# Patient Record
Sex: FEMALE | Race: WHITE | NOT HISPANIC OR LATINO | Employment: FULL TIME | URBAN - METROPOLITAN AREA
[De-identification: names, ages, dates, MRNs, and addresses within clinical notes are randomized per-mention and may not be internally consistent; named-entity substitution may affect disease eponyms.]

---

## 2017-09-12 ENCOUNTER — TRANSCRIBE ORDERS (OUTPATIENT)
Dept: ADMINISTRATIVE | Facility: HOSPITAL | Age: 52
End: 2017-09-12

## 2017-09-12 DIAGNOSIS — Z12.31 ENCOUNTER FOR MAMMOGRAM TO ESTABLISH BASELINE MAMMOGRAM: Primary | ICD-10-CM

## 2017-09-14 ENCOUNTER — HOSPITAL ENCOUNTER (OUTPATIENT)
Dept: RADIOLOGY | Facility: HOSPITAL | Age: 52
Discharge: HOME | End: 2017-09-14
Payer: COMMERCIAL

## 2017-09-14 DIAGNOSIS — Z12.31 ENCOUNTER FOR MAMMOGRAM TO ESTABLISH BASELINE MAMMOGRAM: ICD-10-CM

## 2017-09-14 PROCEDURE — G0202 SCR MAMMO BI INCL CAD: HCPCS

## 2018-01-18 ENCOUNTER — TRANSCRIBE ORDERS (OUTPATIENT)
Dept: ADMINISTRATIVE | Facility: HOSPITAL | Age: 53
End: 2018-01-18

## 2018-01-18 DIAGNOSIS — R06.83 SNORING: ICD-10-CM

## 2018-01-18 DIAGNOSIS — R40.0 SLEEPINESS: Primary | ICD-10-CM

## 2018-01-18 DIAGNOSIS — R53.83 FATIGUE, UNSPECIFIED TYPE: ICD-10-CM

## 2018-04-11 ENCOUNTER — TRANSCRIBE ORDERS (OUTPATIENT)
Dept: ADMINISTRATIVE | Facility: HOSPITAL | Age: 53
End: 2018-04-11

## 2018-04-11 DIAGNOSIS — R10.9 ABDOMINAL PAIN, UNSPECIFIED ABDOMINAL LOCATION: Primary | ICD-10-CM

## 2018-04-11 DIAGNOSIS — K76.0 FATTY LIVER: ICD-10-CM

## 2018-04-16 ENCOUNTER — HOSPITAL ENCOUNTER (OUTPATIENT)
Dept: RADIOLOGY | Facility: HOSPITAL | Age: 53
Discharge: HOME | End: 2018-04-16
Attending: INTERNAL MEDICINE
Payer: COMMERCIAL

## 2018-04-16 DIAGNOSIS — R10.9 ABDOMINAL PAIN, UNSPECIFIED ABDOMINAL LOCATION: ICD-10-CM

## 2018-04-16 DIAGNOSIS — K76.0 FATTY LIVER: ICD-10-CM

## 2018-04-16 PROCEDURE — 76705 ECHO EXAM OF ABDOMEN: CPT

## 2018-04-17 ENCOUNTER — TRANSCRIBE ORDERS (OUTPATIENT)
Dept: ADMINISTRATIVE | Facility: HOSPITAL | Age: 53
End: 2018-04-17

## 2018-04-17 DIAGNOSIS — R16.0 LIVER MASS: Primary | ICD-10-CM

## 2018-04-25 ENCOUNTER — HOSPITAL ENCOUNTER (OUTPATIENT)
Dept: RADIOLOGY | Facility: HOSPITAL | Age: 53
Discharge: HOME | End: 2018-04-25
Attending: INTERNAL MEDICINE
Payer: COMMERCIAL

## 2018-04-25 DIAGNOSIS — R16.0 LIVER MASS: ICD-10-CM

## 2018-04-25 PROCEDURE — A9577 INJ MULTIHANCE: HCPCS | Performed by: INTERNAL MEDICINE

## 2018-04-25 PROCEDURE — 74183 MRI ABD W/O CNTR FLWD CNTR: CPT

## 2018-04-25 RX ADMIN — GADOBENATE DIMEGLUMINE 24 ML: 529 INJECTION, SOLUTION INTRAVENOUS at 17:23

## 2018-06-28 ENCOUNTER — TELEPHONE (OUTPATIENT)
Dept: NEUROLOGY | Facility: CLINIC | Age: 53
End: 2018-06-28

## 2018-07-17 ENCOUNTER — TELEPHONE (OUTPATIENT)
Dept: NEUROLOGY | Facility: CLINIC | Age: 53
End: 2018-07-17

## 2018-07-31 ENCOUNTER — TELEPHONE (OUTPATIENT)
Dept: NEUROLOGY | Facility: CLINIC | Age: 53
End: 2018-07-31

## 2018-08-20 ENCOUNTER — TRANSCRIBE ORDERS (OUTPATIENT)
Dept: ADMINISTRATIVE | Facility: HOSPITAL | Age: 53
End: 2018-08-20

## 2018-08-20 DIAGNOSIS — Z12.31 VISIT FOR SCREENING MAMMOGRAM: Primary | ICD-10-CM

## 2018-08-21 ENCOUNTER — OFFICE VISIT (OUTPATIENT)
Dept: SLEEP CENTER | Facility: CLINIC | Age: 53
End: 2018-08-21
Payer: COMMERCIAL

## 2018-08-21 VITALS
BODY MASS INDEX: 37.13 KG/M2 | SYSTOLIC BLOOD PRESSURE: 130 MMHG | HEIGHT: 68 IN | HEART RATE: 72 BPM | DIASTOLIC BLOOD PRESSURE: 90 MMHG | WEIGHT: 245 LBS

## 2018-08-21 DIAGNOSIS — R40.0 SLEEPINESS: ICD-10-CM

## 2018-08-21 DIAGNOSIS — R06.83 SNORING: Primary | ICD-10-CM

## 2018-08-21 DIAGNOSIS — F51.04 PSYCHOPHYSIOLOGICAL INSOMNIA: Primary | ICD-10-CM

## 2018-08-21 DIAGNOSIS — R53.83 FATIGUE, UNSPECIFIED TYPE: ICD-10-CM

## 2018-08-21 PROCEDURE — 99204 OFFICE O/P NEW MOD 45 MIN: CPT | Performed by: INTERNAL MEDICINE

## 2018-08-21 RX ORDER — TRAZODONE HYDROCHLORIDE 50 MG/1
TABLET ORAL
COMMUNITY
Start: 2018-02-25

## 2018-08-21 RX ORDER — METOPROLOL SUCCINATE 25 MG/1
25 TABLET, EXTENDED RELEASE ORAL DAILY
Refills: 2 | COMMUNITY
Start: 2018-08-02

## 2018-08-21 RX ORDER — TRAMADOL HYDROCHLORIDE 50 MG/1
50 TABLET ORAL EVERY 6 HOURS PRN
COMMUNITY
End: 2018-08-21 | Stop reason: CLARIF

## 2018-08-21 RX ORDER — LEVOTHYROXINE SODIUM 0.03 MG/1
25 TABLET ORAL DAILY
Refills: 5 | COMMUNITY
Start: 2018-08-06

## 2018-08-21 NOTE — PROGRESS NOTES
Consultation - [address] [facility] [address] : 1965  MRN: [id number]      Assessment:  The patient has chronic sleep initiation and sleep maintenance insomnia  There is a possible component of obstructive sleep apnea, since she does have a history of loud snoring, which has gotten worse over the last two years  Her sleep hygiene is unremarkable  I have given her a book to read on the topic  I suggested that she try puzzles such as Sudoku or crossword puzzles instead of reading  Plan:  Home sleep testing  I told her to discontinue trazodone  If it makes no difference in the sleep latency or sleep quality, I told her to discontinue it  Alternative treatment may be considered  I have referred the patient for cognitive behavioral therapy for insomnia  Her symptoms seem to be in part related to anxiety  We also discussed some specific measures about controlling her mental activity at bedtime  Follow up: After testing    History of Present Illness:   48 y  o female with a history of difficulty initiating and maintaining sleep since she was in college  She has never improved subsequently  She denies severe anxiety otherwise, but has a very active mind, with racing thoughts at bedtime  She has been reading books and lately listening to audio books to help her to fall asleep  She has loud snoring but no witnessed apnea  She does awakening with a gasping sensation  Her symptoms often occur at sleep onset  It takes her up to 45 minutes to fall asleep  She cannot nap during the day  She drinks only one cup of coffee every morning  She denies any shortness of breath, wheezing or gastroesophageal reflux  She denies any restless legs or other musculoskeletal complaints      Review of Systems:        Genitourinary need to urinate more than twice a night   Cardiology ankle/leg swelling   Gastrointestinal none   Neurology none   Constitutional none   Integumentary none   Psychiatry none Musculoskeletal joint pain and muscle aches   Pulmonary snoring   ENT none   Endocrine frequent urination   Hematological none           Historical Information    Past Medical History:  Hypothyroidism, goiter, hypertension      Family History: non-contributory      Sleep Schedule: The patient takes 45 minutes to fall asleep and awakens during the night, often unable to fall back to sleep  Snoring:    Yes      Witnessed Apnea:    No    Medications/Allergies:    Current Outpatient Prescriptions:     levothyroxine 25 mcg tablet, , Disp: , Rfl: 5    metoprolol succinate (TOPROL-XL) 25 mg 24 hr tablet, , Disp: , Rfl: 2    traZODone (DESYREL) 50 mg tablet, , Disp: , Rfl:         No notes on file                  Objective:    Vital Signs:   Vitals:    08/21/18 1300   BP: 130/90   Pulse: 72   Weight: 111 kg (245 lb)   Height: 5' 8" (1 727 m)     Neck Circumference: 39      [place] Sleepiness Scale: Total score: 0    Physical Exam:    General: Alert, appropriate, cooperative, overweight    Head: NC/AT, no retrognathia    Nose: No septal deviation, nares partially obstructed, mucosa normal    Throat: Airway lumen narrowed, tongue base thickened, no tonsils visualized    Neck: Normal, no thyromegaly or lymphadenopathy, no JVD    Heart: RR, normal S1 and S2, no murmurs    Chest: Clear bilaterally    Extremity: No clubbing, cyanosis, no edema    Skin: Warm, dry    Neuro: No motor abnormalities, cranial nerves appear intact      Counseling / Coordination of Care  Total clinic time spent today 35 minutes  Greater than 50% of total time was spent with the patient and / or family counseling and / or coordination of care  A description of the counseling / coordination of care: Sleep hygiene was discussed, including sleep schedule, avoidance of certain foods and beverages as well as maintenance of an environment in the bedroom, which is conducive to sleep   I have recommended the book No More Sleepless Nights, by Dr Barajas Peers Chelsie Reyes and KIARA Pandya    Board Certified Sleep Specialist

## 2018-09-17 ENCOUNTER — HOSPITAL ENCOUNTER (OUTPATIENT)
Dept: RADIOLOGY | Facility: HOSPITAL | Age: 53
Discharge: HOME | End: 2018-09-17
Payer: COMMERCIAL

## 2018-09-17 DIAGNOSIS — Z12.31 VISIT FOR SCREENING MAMMOGRAM: ICD-10-CM

## 2018-09-17 PROCEDURE — 77063 BREAST TOMOSYNTHESIS BI: CPT

## 2018-09-17 PROCEDURE — 77067 SCR MAMMO BI INCL CAD: CPT

## 2018-09-27 ENCOUNTER — TRANSCRIBE ORDERS (OUTPATIENT)
Dept: ADMINISTRATIVE | Facility: HOSPITAL | Age: 53
End: 2018-09-27

## 2018-09-27 DIAGNOSIS — K76.0 NAFL (NONALCOHOLIC FATTY LIVER): Primary | ICD-10-CM

## 2018-09-29 ENCOUNTER — HOSPITAL ENCOUNTER (OUTPATIENT)
Dept: RADIOLOGY | Facility: HOSPITAL | Age: 53
Discharge: HOME | End: 2018-09-29
Attending: INTERNAL MEDICINE
Payer: COMMERCIAL

## 2018-09-29 DIAGNOSIS — K76.0 NAFL (NONALCOHOLIC FATTY LIVER): ICD-10-CM

## 2018-09-29 PROCEDURE — 76700 US EXAM ABDOM COMPLETE: CPT

## 2018-10-10 ENCOUNTER — OFFICE VISIT (OUTPATIENT)
Dept: NEUROLOGY | Facility: CLINIC | Age: 53
End: 2018-10-10
Payer: COMMERCIAL

## 2018-10-10 VITALS
RESPIRATION RATE: 14 BRPM | BODY MASS INDEX: 37.13 KG/M2 | WEIGHT: 245 LBS | HEART RATE: 76 BPM | DIASTOLIC BLOOD PRESSURE: 80 MMHG | SYSTOLIC BLOOD PRESSURE: 120 MMHG | HEIGHT: 68 IN

## 2018-10-10 DIAGNOSIS — G57.51 TARSAL TUNNEL SYNDROME OF RIGHT SIDE: ICD-10-CM

## 2018-10-10 DIAGNOSIS — G57.52 TARSAL TUNNEL SYNDROME OF LEFT SIDE: ICD-10-CM

## 2018-10-10 DIAGNOSIS — G62.9 SENSORY NEUROPATHY: Primary | ICD-10-CM

## 2018-10-10 DIAGNOSIS — Z87.898 HISTORY OF BORDERLINE DIABETES MELLITUS: ICD-10-CM

## 2018-10-10 DIAGNOSIS — R20.0 RIGHT ARM NUMBNESS: ICD-10-CM

## 2018-10-10 PROCEDURE — 99244 OFF/OP CNSLTJ NEW/EST MOD 40: CPT | Performed by: PSYCHIATRY & NEUROLOGY

## 2018-10-10 NOTE — PROGRESS NOTES
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- repeat EMG and further work up as below  -     Vitamin B12; Future  -     Folate; Future  -     Vitamin B1, whole blood; Future  -     Vitamin B6; Future  -     Protein electrophoresis, serum; Future  -     NIMESH Screen w/ Reflex to Titer/Pattern; Future  -     RF Screen w/ Reflex to Titer; Future  -     HEMOGLOBIN A1C W/ EAG ESTIMATION; Future  -     EMG 1 Upper/1 Lower Neuropathy; Future    History of borderline diabetes mellitus  -     HEMOGLOBIN A1C W/ EAG ESTIMATION; Future    Tarsal tunnel syndrome of right side- severe axonal damage per 10/2017 report- discussed with her with tarsal tunnel surgery unlikely to regain further function with this given already severe damage- states she understands  -     EMG 1 Upper/1 Lower Neuropathy; Future  -     Ambulatory referral to Physical Therapy; Future    Tarsal tunnel syndrome of left side-- mild demyelinating per 10/2017 EMG  Will repeat and if further demyelination and/or acute denervation will refer back to her foot surgeon for tarsal tunnel surgery  -     EMG 1 Upper/1 Lower Neuropathy; Future  -     Ambulatory referral to Physical Therapy; Future for therapy for tarsal tunnel syndrome   - She tells me she has had "atypical- flat feet" and easy tendon ruptures with walking requiring multiple foot surgeries starting  In her 35s  She also reports joint laxity  No cardiac symptoms  No family hx  However with hx given concern for underlying connective tissue disorder- will consider rheumatology referral     Right arm numbness- radial nerve distribution hand radiating up to the forearm right  -     EMG 1 Upper/1 Lower Neuropathy; Future       She is not interested in nerve pain medication at this time    Further recs pending above  Follow up with me in 3-4 months time    Subjective:    HPI     Ms Pau Segura is a pleasant 47 yo female seen in consultation for significant foot pain  States several foot surgeries over time due to flat feet then easy achilles rupture and other joint issues- starting late 35s  States she was walking and ruptured right flexor hallucis tendon and another "calf tendon"  States she has weakness with plantarflexion - demonstrated this  States has pain in the inside of the ankles for the last two to three years ast least with all the foot surgeries  States the surgeries overall were successful but her pain in the inner side of her ankles continue to be severe  She had EMG done 10/2017 with 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  Left with mild demyelinating tibial neuropathy  States any activity is challenging as cannot stand over 15-20 minutes without a sharp shooting pain  Cannot go hiking or wear most shoes  Pain severity-- 5+ every day for years  No balance issues or falls  Does have right sharp shooting pain starting with weakness in right thumb going up the lateral aspect of the right hand  States increasing weakness over the month  States does not wake her in the middle of the night  States had an odd uncomfortable sensation at the base of he neck all the time all summer improved with massage etc      States had left hand pain and weakness with tingling sensation in the left arm years ago that resolved over time with minimal symptoms now  States did wear a brace- 5-7 years ago  No known spine issues per her  Had significant joint laxity when younger but no longer  Denies other health issues, states no cardiac issues personal or family  No DM hx, is on synthroid- does have pituitary adenoma on MRI- followed by endocrinology, no gastric absorption issues    Other history vertigo- improved with vestibular therapy  States mom had neuropathy in her 46s- from the low back per her     Denies family hx connective tissue disorders  States brother passed away from suspected thyroid storm,   Other hx pituitary adenoma  States tobacco use now- stopped five years ago  No alcohol use    The following portions of the patient's history were reviewed and updated as appropriate: allergies, current medications, past family history, past medical history, past social history, past surgical history and problem list          Objective:    Blood pressure 120/80, pulse 76, resp  rate 14, height 5' 8" (1 727 m), weight 111 kg (245 lb)  Physical Exam   Constitutional: She is oriented to person, place, and time  She appears well-developed and well-nourished  HENT:   Head: Normocephalic and atraumatic  Eyes: Pupils are equal, round, and reactive to light  Neck: Normal range of motion  Cardiovascular: Normal rate and regular rhythm  Pulmonary/Chest: Effort normal    Abdominal: Soft  Musculoskeletal: She exhibits no tenderness  Neurological: She is alert and oriented to person, place, and time  She has normal reflexes  Coordination normal    Nursing note and vitals reviewed  Neurological Exam  Mental Status  Alert  Oriented to person, place, time and situation  Recent and remote memory are intact  no dysarthria present  Language is fluent with no aphasia  Attention and concentration are normal     Cranial Nerves  CN II: Visual fields full to confrontation  CN III, IV, VI: Extraocular movements intact bilaterally  Pupils equal round and reactive to light bilaterally  CN V: Facial sensation is normal   CN VII: Full and symmetric facial movement  CN VIII: Hearing is normal   CN IX, X: Palate elevates symmetrically  Normal gag reflex  CN XI: Shoulder shrug strength is normal   CN XII: Tongue midline without atrophy or fasciculations  Motor   Normal muscle tone  Strength is 5/5 in all four extremities except as noted  4+5 right foot plantar flexion with 3-/5 right toe flexion   Left foot strength 5/5     Sensory  Sensation is intact to light touch, pinprick, vibration and proprioception in all four extremities  Light touch is normal in upper and lower extremities  Temperature is normal in upper and lower extremities  Vibration is normal in upper and lower extremities  No right-sided hemispatial neglect  No left-sided hemispatial neglect  Reflexes  Deep tendon reflexes are 2+ and symmetric in all four extremities with downgoing toes bilaterally  Coordination  Finger-to-nose, rapid alternating movements and heel-to-shin normal bilaterally without dysmetria  Gait Romberg is absent  Walks with left foot brace (s/p surgery) wide based normal stride length no ataxia  ROS:    Review of Systems   Constitutional: Negative  Negative for appetite change and fever  HENT: Negative  Negative for hearing loss, tinnitus, trouble swallowing and voice change  Eyes: Negative  Negative for photophobia and pain  Respiratory: Negative  Negative for shortness of breath  Cardiovascular: Negative  Negative for palpitations  Gastrointestinal: Negative  Negative for nausea and vomiting  Endocrine: Negative  Negative for cold intolerance and heat intolerance  Genitourinary: Positive for frequency and urgency  Negative for dysuria  Musculoskeletal: Negative  Negative for myalgias and neck pain  Joint pain  Pain while walking   Skin: Negative  Negative for rash  Neurological: Negative  Negative for dizziness, tremors, seizures, syncope, facial asymmetry, speech difficulty, weakness, light-headedness, numbness and headaches  Trouble falling asleep   Hematological: Negative  Does not bruise/bleed easily  Psychiatric/Behavioral: Negative  Negative for confusion, hallucinations and sleep disturbance

## 2018-10-10 NOTE — LETTER
October 10, 2018     [place], [address] [facility] [address] [address]    Patient: Danny Aponte   YOB: 1965   Date of Visit: 10/10/2018       Dear Dr Kaci Page: Thank you for referring Rejidebbie Newton to me for evaluation  Below are my notes for this consultation  If you have questions, please do not hesitate to call me  I look forward to following your patient along with you  Sincerely,        Gabbie Farah DO        CC: No Recipients  [address] [facility],   10/10/2018  5:01 PM  Sign at close encounter  Patient ID: Danny Aponte is a 48 y o  female  Assessment/Plan:    No problem-specific Assessment & Plan notes found for this encounter  Diagnoses and all orders for this visit:    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- repeat EMG and further work up as below  -     Vitamin B12; Future  -     Folate; Future  -     Vitamin B1, whole blood; Future  -     Vitamin B6; Future  -     Protein electrophoresis, serum; Future  -     NIMESH Screen w/ Reflex to Titer/Pattern; Future  -     RF Screen w/ Reflex to Titer; Future  -     HEMOGLOBIN A1C W/ EAG ESTIMATION; Future  -     EMG 1 Upper/1 Lower Neuropathy; Future    History of borderline diabetes mellitus  -     HEMOGLOBIN A1C W/ EAG ESTIMATION; Future    Tarsal tunnel syndrome of right side- severe axonal damage per 10/2017 report- discussed with her with tarsal tunnel surgery unlikely to regain further function with this given already severe damage- states she understands  -     EMG 1 Upper/1 Lower Neuropathy; Future  -     Ambulatory referral to Physical Therapy; Future    Tarsal tunnel syndrome of left side-- mild demyelinating per 10/2017 EMG  Will repeat and if further demyelination and/or acute denervation will refer back to her foot surgeon for tarsal tunnel surgery  -     EMG 1 Upper/1 Lower Neuropathy; Future  -     Ambulatory referral to Physical Therapy;  Future for therapy for tarsal tunnel syndrome   - She tells me she has had "atypical- flat feet" and easy tendon ruptures with walking requiring multiple foot surgeries starting  In her 35s  She also reports joint laxity  No cardiac symptoms  No family hx  However with hx given concern for underlying connective tissue disorder- will consider rheumatology referral     Right arm numbness- radial nerve distribution hand radiating up to the forearm right  -     EMG 1 Upper/1 Lower Neuropathy; Future       She is not interested in nerve pain medication at this time  Further recs pending above  Follow up with me in 3-4 months time    Subjective:    HPI     Ms Neha Olvera is a pleasant 49 yo female seen in consultation for significant foot pain  States several foot surgeries over time due to flat feet then easy achilles rupture and other joint issues- starting late 35s  States she was walking and ruptured right flexor hallucis tendon and another "calf tendon"  States she has weakness with plantarflexion - demonstrated this  States has pain in the inside of the ankles for the last two to three years ast least with all the foot surgeries  States the surgeries overall were successful but her pain in the inner side of her ankles continue to be severe  She had EMG done 10/2017 with 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  Left with mild demyelinating tibial neuropathy  States any activity is challenging as cannot stand over 15-20 minutes without a sharp shooting pain  Cannot go hiking or wear most shoes  Pain severity-- 5+ every day for years  No balance issues or falls  Does have right sharp shooting pain starting with weakness in right thumb going up the lateral aspect of the right hand  States increasing weakness over the month  States does not wake her in the middle of the night   States had an odd uncomfortable sensation at the base of he neck all the time all summer improved with massage etc      States had left hand pain and weakness with tingling sensation in the left arm years ago that resolved over time with minimal symptoms now  States did wear a brace- 5-7 years ago  No known spine issues per her  Had significant joint laxity when younger but no longer  Denies other health issues, states no cardiac issues personal or family  No DM hx, is on synthroid- does have pituitary adenoma on MRI- followed by endocrinology, no gastric absorption issues    Other history vertigo- improved with vestibular therapy  States mom had neuropathy in her 46s- from the low back per her  Denies family hx connective tissue disorders  States brother passed away from suspected thyroid storm,   Other hx pituitary adenoma  States tobacco use now- stopped five years ago  No alcohol use    The following portions of the patient's history were reviewed and updated as appropriate: allergies, current medications, past family history, past medical history, past social history, past surgical history and problem list          Objective:    Blood pressure 120/80, pulse 76, resp  rate 14, height 5' 8" (1 727 m), weight 111 kg (245 lb)  Physical Exam   Constitutional: She is oriented to person, place, and time  She appears well-developed and well-nourished  HENT:   Head: Normocephalic and atraumatic  Eyes: Pupils are equal, round, and reactive to light  Neck: Normal range of motion  Cardiovascular: Normal rate and regular rhythm  Pulmonary/Chest: Effort normal    Abdominal: Soft  Musculoskeletal: She exhibits no tenderness  Neurological: She is alert and oriented to person, place, and time  She has normal reflexes  Coordination normal    Nursing note and vitals reviewed  Neurological Exam  Mental Status  Alert  Oriented to person, place, time and situation  Recent and remote memory are intact  no dysarthria present  Language is fluent with no aphasia   Attention and concentration are normal     Cranial Nerves  CN II: Visual fields full to confrontation  CN III, IV, VI: Extraocular movements intact bilaterally  Pupils equal round and reactive to light bilaterally  CN V: Facial sensation is normal   CN VII: Full and symmetric facial movement  CN VIII: Hearing is normal   CN IX, X: Palate elevates symmetrically  Normal gag reflex  CN XI: Shoulder shrug strength is normal   CN XII: Tongue midline without atrophy or fasciculations  Motor   Normal muscle tone  Strength is 5/5 in all four extremities except as noted  4+5 right foot plantar flexion with 3-/5 right toe flexion  Left foot strength 5/5  Sensory  Sensation is intact to light touch, pinprick, vibration and proprioception in all four extremities  Light touch is normal in upper and lower extremities  Temperature is normal in upper and lower extremities  Vibration is normal in upper and lower extremities  No right-sided hemispatial neglect  No left-sided hemispatial neglect  Reflexes  Deep tendon reflexes are 2+ and symmetric in all four extremities with downgoing toes bilaterally  Coordination  Finger-to-nose, rapid alternating movements and heel-to-shin normal bilaterally without dysmetria  Gait Romberg is absent  Walks with left foot brace (s/p surgery) wide based normal stride length no ataxia  ROS:    Review of Systems   Constitutional: Negative  Negative for appetite change and fever  HENT: Negative  Negative for hearing loss, tinnitus, trouble swallowing and voice change  Eyes: Negative  Negative for photophobia and pain  Respiratory: Negative  Negative for shortness of breath  Cardiovascular: Negative  Negative for palpitations  Gastrointestinal: Negative  Negative for nausea and vomiting  Endocrine: Negative  Negative for cold intolerance and heat intolerance  Genitourinary: Positive for frequency and urgency  Negative for dysuria  Musculoskeletal: Negative  Negative for myalgias and neck pain          Joint pain  Pain while walking Skin: Negative  Negative for rash  Neurological: Negative  Negative for dizziness, tremors, seizures, syncope, facial asymmetry, speech difficulty, weakness, light-headedness, numbness and headaches  Trouble falling asleep   Hematological: Negative  Does not bruise/bleed easily  Psychiatric/Behavioral: Negative  Negative for confusion, hallucinations and sleep disturbance

## 2018-10-25 ENCOUNTER — EVALUATION (OUTPATIENT)
Dept: PHYSICAL THERAPY | Facility: CLINIC | Age: 53
End: 2018-10-25
Payer: COMMERCIAL

## 2018-10-25 DIAGNOSIS — G57.51 TARSAL TUNNEL SYNDROME OF RIGHT SIDE: ICD-10-CM

## 2018-10-25 DIAGNOSIS — G57.52 TARSAL TUNNEL SYNDROME OF LEFT SIDE: ICD-10-CM

## 2018-10-25 PROCEDURE — 97161 PT EVAL LOW COMPLEX 20 MIN: CPT

## 2018-10-25 PROCEDURE — G8979 MOBILITY GOAL STATUS: HCPCS

## 2018-10-25 PROCEDURE — G8978 MOBILITY CURRENT STATUS: HCPCS

## 2018-10-25 NOTE — PROGRESS NOTES
PT Evaluation     Today's date: 10/25/2018  Patient name: Gabriella Trujillo  : 1965  MRN: [id number]  Referring provider: Belén Hdez DO  Dx:   Encounter Diagnosis     ICD-10-CM    1  Tarsal tunnel syndrome of right side G57 51 Ambulatory referral to Physical Therapy   2  Tarsal tunnel syndrome of left side G57 52 Ambulatory referral to Physical Therapy       Start Time: 1535  Stop Time: 1605  Total time in clinic (min): 30 minutes    Assessment  Impairments: abnormal gait, abnormal muscle firing, abnormal muscle tone, abnormal or restricted ROM, abnormal movement, impaired balance, impaired physical strength, lacks appropriate home exercise program, pain with function and weight-bearing intolerance    Symptom irritability: high  Assessment details: Patient presents today with BL foot pain that is located inferior to medial malleolus and radiates up the calf  Pain is at best 2/10 and at worst 6/10 which is dull and achy but can become sharp  Moderate TTP at the tarsal tunnel present BL  Moderate-severe hypertonicity and length restrictions present at the gastrocs/soleus BL  Mod length restrictions also present at the HS, quads, and IT band indicated by special testing  Strength impairments present BL especially at musculature surrounding the hips and ankles  Ankle AROM/PROM limited with greatest impairments with R Dorsiflexion  Gait abnormalities include BL forefoot pronation, tibial IR and knee hyperextension present BL  Patient would benefit from skilled PT in order to reduce pain to 0/10 at best, improve strength of BL LEs to 5/5, reduce hypertonicity and length restrictions present in BL LEs, address gait abnormalities, and improve pain free AROM/PROM to [facility] in order to stand for long durations, ambulate, ascend/descend stairs, exercise, go on hikes, and 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     Understanding of Dx/Px/POC: good Prognosis: good    Goals  STGs  1  Reduce pain levels to 0/10 at best   2  Improve strength of BL LEs by 1/4-1/2 grade  3  Improve ankle ROM by 5 degrees so she can ambulate with functional motion available  4  Complete 3 sets of step ups pain free to progress to ambulating stairs  LTGs  1  Reduce pain levels to constant 0/10  2  Improve strength of BL LEs to 4+ or greater out of 5 so she can ambulate and stand for long durations while teaching as a   3  Improve ROM of BL ankles to WNL for all planes so she can walk without limitations due to motion availability  4  Ascend and descend 10 flights of stairs symptom free in order to safely ambulate stairs at home and in the community  Plan  Patient would benefit from: skilled physical therapy and PT eval  Planned modality interventions: manual electrical stimulation, TENS, thermotherapy: hydrocollator packs, ultrasound and unattended electrical stimulation  Planned therapy interventions: abdominal trunk stabilization, joint mobilization, manual therapy, neuromuscular re-education, orthotic fitting/training, patient education, orthotic management and training, postural training, strengthening, stretching, therapeutic activities, therapeutic exercise, home exercise program, graded motor, graded exercise, gait training, flexibility, balance/weight bearing training and balance  Frequency: 2x week  Duration in weeks: 6  Plan of Care beginning date: 10/25/2018  Plan of Care expiration date: 12/6/2018  Treatment plan discussed with: patient        Subjective Evaluation    History of Present Illness  Date of onset: 12/25/2016  Mechanism of injury: Patient reports chronic foot problems and has had 4 foot surgeries  Including buyon and tendon transfers  Since her last surgery  ( December 2016)she  has not felt good, & found out she has mod-severe nerve damage in her feet   Told she might need tarsal tunnel surgery by orthopedic and also referred her to neurologist  Saw neurologist who told her the nerve damage is not reversible and doesn't recommend getting surgery  Is getting another EMG done in November on the other foot  Cant wear shoes, cant stand longer than 15 min  She goes to the gym but feels like it isnt helping  Can not walk stairs like a normal person  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  Has Hx of tendon rupture in the R and had a tendon transfer in the L to prevent L side from rupturing  N/T present in feet as well as shooting pain into foot and shoots up to buttocks     Recurrent probem    Quality of life: good    Pain  Current pain rating: 3  At best pain ratin  At worst pain ratin  Location: Medial ankle into calf BL  Quality: dull ache and sharp  Relieving factors: ice, medications and rest  Aggravating factors: standing, walking, stair climbing and running    Social Support  Steps to enter house: yes  Stairs in house: yes   Lives in: multiple-level home  Lives with: spouse    Employment status: working    Diagnostic Tests  EMG: abnormal  Treatments  Previous treatment: physical therapy and medication  Current treatment: physical therapy  Patient Goals  Patient goals for therapy: decreased pain, improved balance, increased motion, return to work, independence with ADLs/IADLs, increased strength and return to sport/leisure activities  Patient goal: be able to stand, walk, ambulate stairs, go on hikes, and work as a teacher symptom free        Objective     Active Range of Motion   Left Ankle/Foot   Dorsiflexion (ke): 0 degrees   Dorsiflexion (kf): 0 degrees   Plantar flexion: 50 degrees with pain  Inversion: 15 degrees with pain  Eversion: 15 degrees with pain    Right Ankle/Foot   Dorsiflexion (ke): -25 degrees with pain  Dorsiflexion (kf): -15 degrees with pain  Plantar flexion: 60 degrees with pain  Inversion: 30 degrees with pain  Eversion: 15 degrees with pain    Passive Range of Motion   Left Ankle/Foot    Dorsiflexion (ke): 5 degrees   Dorsiflexion (kf): 5 degrees   Plantar flexion: 50 degrees with pain  Inversion: 30 degrees with pain  Eversion: 15 degrees with pain    Right Ankle/Foot    Dorsiflexion (ke): -20 degrees with pain  Dorsiflexion (kf): -10 degrees with pain   Plantar flexion: 60 degrees with pain  Inversion: 30 degrees with pain  Eversion: 20 degrees with pain    Strength/Myotome Testing     Left Hip   Normal muscle strength  Planes of Motion   Flexion: 4  Extension: 3-  Abduction: 4  External rotation: 4  Internal rotation: 4+    Right Hip   Planes of Motion   Flexion: 4-  Extension: 3-  External rotation: 4-  Internal rotation: 4-    Left Knee   Flexion: 4+  Extension: 5    Right Knee   Flexion: 4+  Extension: 5    Left Ankle/Foot   Dorsiflexion: 4  Plantar flexion: 4  Inversion: 4  Eversion: 4    Right Ankle/Foot   Dorsiflexion: 4-  Plantar flexion: 4-  Inversion: 3+  Eversion: 3+    Swelling   Left Ankle/Foot   Figure 8: 52 8 cm    Right Ankle/Foot   Figure 8: 52 8 cm    Ambulation     Comments   Gait:  Forefoot pronation BL  Tibial IR BL  Knee Hyperextensoin BL    General Comments     Ankle/Foot Comments   HS Flexibility:  (-30) BL  Tarsal Tunnel:  (+) BL     Bob Test:  (+) BL    Hemalatha Test:   (+) BL      Flowsheet Rows      Most Recent Value   PT/OT G-Codes   Current Score  56   Projected Score  81   FOTO information reviewed  Yes   Assessment Type  Evaluation   G code set  Mobility: Walking & Moving Around   Mobility: Walking and Moving Around Current Status ()  CK   Mobility: Walking and Moving Around Goal Status ()  CJ          Precautions: Standard      Daily Treatment Diary     Manual                                                                                   Exercise Diary Modalities

## 2018-10-30 ENCOUNTER — OFFICE VISIT (OUTPATIENT)
Dept: PHYSICAL THERAPY | Facility: CLINIC | Age: 53
End: 2018-10-30
Payer: COMMERCIAL

## 2018-10-30 DIAGNOSIS — G57.51 TARSAL TUNNEL SYNDROME OF RIGHT SIDE: Primary | ICD-10-CM

## 2018-10-30 DIAGNOSIS — G57.52 TARSAL TUNNEL SYNDROME OF LEFT SIDE: ICD-10-CM

## 2018-10-30 PROCEDURE — 97110 THERAPEUTIC EXERCISES: CPT

## 2018-10-30 NOTE — PROGRESS NOTES
Daily Note     Today's date: 10/30/2018  Patient name: Ami Sweet  : 1965  MRN: [id number]  Referring provider: Nixon Reich DO  Dx:   Encounter Diagnosis     ICD-10-CM    1  Tarsal tunnel syndrome of right side G57 51    2  Tarsal tunnel syndrome of left side G57 52        Start Time: 1715  Stop Time: 1800  Total time in clinic (min): 45 minutes    Subjective: Patient reports that she was sore after her eval ,and was limited in her exercises due to pain especially on the L side  Objective: See treatment diary below  Precautions: Standard  Daily Treatment Diary     Manual  10/30            STM Calf musculature and foot instrinsics 6 min                                                                     Exercise Diary  10/30            Tibial N  Glide             Gastrocs /Soleus Stretch 10 x 10" each gastrocs; soleus only on the R            Seated HR/TR 1 x 10each             Towel Scrunches 2 min each            Ankle 4 Way             Pro Stretch             Golf Ball Massage 1 min each            FTEO Firm             FTEC Foam                                                                                                                                                                Modalities                                                         Assessment: Tolerated treatment well  Unable to complete soleus stretch on the L side due to pain  Plan to progress manual tx to address her pain levels as well as adding pulsed ultrasound to promote tissue healing  Plan to add nerve glides to BL LEs due to complaints of N/T in LEs  Patient would benefit from continued PT in order to reduce pain, strengthen LEs and promote flexibility in LEs  Plan: Continue per plan of care

## 2018-11-06 ENCOUNTER — OFFICE VISIT (OUTPATIENT)
Dept: PHYSICAL THERAPY | Facility: CLINIC | Age: 53
End: 2018-11-06
Payer: COMMERCIAL

## 2018-11-06 DIAGNOSIS — G57.52 TARSAL TUNNEL SYNDROME OF LEFT SIDE: ICD-10-CM

## 2018-11-06 DIAGNOSIS — G57.51 TARSAL TUNNEL SYNDROME OF RIGHT SIDE: Primary | ICD-10-CM

## 2018-11-06 PROCEDURE — 97140 MANUAL THERAPY 1/> REGIONS: CPT

## 2018-11-06 PROCEDURE — 97110 THERAPEUTIC EXERCISES: CPT

## 2018-11-08 ENCOUNTER — OFFICE VISIT (OUTPATIENT)
Dept: PHYSICAL THERAPY | Facility: CLINIC | Age: 53
End: 2018-11-08
Payer: COMMERCIAL

## 2018-11-08 DIAGNOSIS — G57.52 TARSAL TUNNEL SYNDROME OF LEFT SIDE: ICD-10-CM

## 2018-11-08 DIAGNOSIS — G57.51 TARSAL TUNNEL SYNDROME OF RIGHT SIDE: Primary | ICD-10-CM

## 2018-11-08 PROCEDURE — 97110 THERAPEUTIC EXERCISES: CPT

## 2018-11-08 PROCEDURE — 97140 MANUAL THERAPY 1/> REGIONS: CPT

## 2018-11-08 NOTE — PROGRESS NOTES
Daily Note     Today's date: 2018  Patient name: Roni Nevarez  : 1965  MRN: [id number]  Referring provider: Jeaneth James DO  Dx:   Encounter Diagnosis     ICD-10-CM    1  Tarsal tunnel syndrome of right side G57 51    2  Tarsal tunnel syndrome of left side G57 52        Start Time: 0900  Stop Time: 0950  Total time in clinic (min): 50 minutes    Subjective: Patient reporths that her feet were throbbing after last session, but since then have improved  Today upon arrival her pain levels in BL feet are 1-2/10  Objective: See treatment diary below    Precautions: Standard  Daily Treatment Diary     Manual  10/30 11/6 11/8/18          STM Calf musculature and foot instrinsics 6 min  Calf musculature and foot instrinsics Calf musculature and foot instrinsics          A-P; M-L Ankle Joint Mobs  Grade II-III Grade II-III          Gastrocs Manual stretching  2 min each 2 min each                                        Exercise Diary  10/30 11/6/18 11/8/18          Tibial N  Glide             Gastrocs /Soleus Stretch 10 x 10" each gastrocs; soleus only on the R            Seated HR/TR 1 x 10each             Towel Scrunches 2 min each 2 min each 2 min each          Ankle 4 Way  YB 1 x 10 each direction BL           Pro Stretch  3 x 30" BL  6x 10" BL          Golf Ball Massage 1 min each 2 min each 2 min each          FTEO Firm             FTEC Foam             NuStep  10 min L1 10 min L1                       Seated N  [place]  20 x's BL           Wobble Board   20x forward and back/ 20x ML           BAPS   20x CW; 20 x CW          Toe Taps   15 x each                                                                               Modalities                                                           Assessment: Tolerated treatment well  Added BAPs board and wobble board to promote ankle mobility BL which increased pain by 1-2 pain levels  Manual tx reduced pain levels present after exercises   Patient would benefit from continued PT In order to continue to strengthen BL LEs and promote ankle mobility  Plan: Continue per plan of care

## 2018-11-13 ENCOUNTER — APPOINTMENT (OUTPATIENT)
Dept: PHYSICAL THERAPY | Facility: CLINIC | Age: 53
End: 2018-11-13
Payer: COMMERCIAL

## 2018-11-15 ENCOUNTER — APPOINTMENT (OUTPATIENT)
Dept: PHYSICAL THERAPY | Facility: CLINIC | Age: 53
End: 2018-11-15
Payer: COMMERCIAL

## 2018-11-20 ENCOUNTER — TELEPHONE (OUTPATIENT)
Dept: PHYSICAL THERAPY | Facility: CLINIC | Age: 53
End: 2018-11-20

## 2018-11-23 ENCOUNTER — HOSPITAL ENCOUNTER (OUTPATIENT)
Dept: SLEEP CENTER | Facility: CLINIC | Age: 53
Discharge: HOME | End: 2018-11-23
Payer: COMMERCIAL

## 2018-11-23 ENCOUNTER — OFFICE VISIT (OUTPATIENT)
Dept: PHYSICAL THERAPY | Facility: CLINIC | Age: 53
End: 2018-11-23
Payer: COMMERCIAL

## 2018-11-23 DIAGNOSIS — G57.53 TARSAL TUNNEL SYNDROME OF BOTH LOWER EXTREMITIES: ICD-10-CM

## 2018-11-23 DIAGNOSIS — R06.83 SNORING: ICD-10-CM

## 2018-11-23 DIAGNOSIS — R53.83 FATIGUE, UNSPECIFIED TYPE: ICD-10-CM

## 2018-11-23 DIAGNOSIS — R40.0 SLEEPINESS: ICD-10-CM

## 2018-11-23 PROCEDURE — G0399 HOME SLEEP TEST/TYPE 3 PORTA: HCPCS

## 2018-11-23 PROCEDURE — 97110 THERAPEUTIC EXERCISES: CPT

## 2018-11-23 PROCEDURE — 97140 MANUAL THERAPY 1/> REGIONS: CPT

## 2018-11-23 NOTE — PROGRESS NOTES
Daily Note     Today's date: 2018  Patient name: Mao Robles  : 1965  MRN: [id number]  Referring provider: Amol Gentile DO  Dx: No diagnosis found  Subjective: Patient reports that her feet have been very painful  As she has had 2 funerals to go to and had to wear uncomfortable shoes  She was also on her feet a lot preparing for thanksgiving  Today pain is 7/10 in BL feet  Objective: See treatment diary below    Precautions: Standard  Daily Treatment Diary     Manual  10/30 11/6 11/8/18 11/23         STM Calf musculature and foot instrinsics 6 min  Calf musculature and foot instrinsics Calf musculature and foot instrinsics Calf musculature and foot instrinsics         A-P; M-L Ankle Joint Mobs  Grade II-III Grade II-III Grade II-III         Gastrocs Manual stretching  2 min each 2 min each 2 min each                                       Exercise Diary  10/30 11/6/18 11/8/18 11/23/18         Tibial N  Glide             Gastrocs /Soleus Stretch 10 x 10" each gastrocs; soleus only on the R            Seated HR/TR 1 x 10each             Towel Scrunches 2 min each 2 min each 2 min each 2 min each         Ankle 4 Way  YB 1 x 10 each direction BL  YB 1 x 10 each direction BL         Pro Stretch  3 x 30" BL  6x 10" BL          Golf Ball Massage 1 min each 2 min each 2 min each 2 min each         FTEO Firm             FTEC Foam             NuStep  10 min L1 10 min L1 6' L 1                      Seated N  [place]  20 x's BL  15x BL         Wobble Board   20x forward and back/ 20x ML           BAPS   20x CW; 20 x CW          Toe Taps   15 x each 15 x each         New Site     2 min          Plantar Fascia Stretch    6 x 10" each                                                    Modalities                                                         Assessment: Tolerated treatment well   Reduction of pain levels  After stretching and mobility exercises for the plantar fascia, foot intrinsics and the gastrocs  Mod-severe hypertonicity present of the foot intrinsics, gastrocs, soleus, and peroneals which reduced after manual tx  Plan: Continue per plan of care

## 2018-11-26 ENCOUNTER — HOSPITAL ENCOUNTER (OUTPATIENT)
Dept: NEUROLOGY | Facility: CLINIC | Age: 53
Discharge: HOME | End: 2018-11-26
Payer: COMMERCIAL

## 2018-11-26 DIAGNOSIS — G57.52 TARSAL TUNNEL SYNDROME OF LEFT SIDE: ICD-10-CM

## 2018-11-26 DIAGNOSIS — R20.0 RIGHT ARM NUMBNESS: ICD-10-CM

## 2018-11-26 DIAGNOSIS — G57.51 TARSAL TUNNEL SYNDROME OF RIGHT SIDE: ICD-10-CM

## 2018-11-26 DIAGNOSIS — G62.9 SENSORY NEUROPATHY: ICD-10-CM

## 2018-11-26 PROCEDURE — 95886 MUSC TEST DONE W/N TEST COMP: CPT | Performed by: PHYSICAL MEDICINE & REHABILITATION

## 2018-11-26 PROCEDURE — 95913 NRV CNDJ TEST 13/> STUDIES: CPT | Performed by: PHYSICAL MEDICINE & REHABILITATION

## 2018-11-26 NOTE — PROCEDURES
[facility]   [facility] [address] , [address]   [phone]        Name: Honey Lake  Patient ID: [id number]   Age: 45 Years 8 Months  YOB: 1974   Account Number:    Gender: Female   Technologist:    Date of Exam: 11/26/2018 09:09   Referring Physician: Felecia Sensor, DO  Temperature: 31 5 right foot, 32 9 right hand   Examining Physician: Elena Manzo MD  Height:                 Patient History:   45 y o female with history of 5 foot/ankle surgeries for chronic foot/ankle deformities  Had EMG in Oct 2017 with bilateral tarsal tunnel syndrome  Referred for peripheral neuropathy/tarsal tunnel evaluation  Also with right upper extremity numbness/tingling along dorsolateral wrist/forearm radiating to forearm, for approximately 5-6 months  Exam: Strength 5/5 bilateral hip flexion, knee extension, dorsi/plantarflexion, EHL  Strength 5/5 R finger flexion/abduction, wrist extension, elbow extension/flexion, shoulder abduction  [address]      Nerve / Sites Muscle Latency Ref  Amplitude Ref  Duration Rel Amp Distance Lat Diff Velocity Ref  ms ms mV mV ms % mm ms m/s m/s   R Median - APB      Wrist APB 3 02 ?4 20 11 2 ?4 0 6 61 100 70         Elbow APB 6 88  9 9  5 26 88 9 200 3 85 52 ?50   R Ulnar - ADM      Wrist ADM 2 50 ?3 30 10 7 ?5 0 4 95 100 70         B  Elbow ADM 5 68  8 2  7 40 76 6 190 3 18 60 ?50      A  Elbow ADM 7 29  8 5  7 29 104 120 1 61 74 ?49            4 79     R Peroneal - EDB      Ankle EDB 4 11 ?5 50 6 1 ?2 0 5 31 100 80         Fib head EDB 10 05  4 5  5 57 73 3 290 5 94 49 ?40      Pop fossa EDB 12 14  5 8  5 94 130 100 2 08 48 ?40            8 02     L Peroneal - EDB      Ankle EDB 4 27 ?5 50 6 4 ?2 0 6 41 100 80         Fib head EDB 10 10  6 0  6 61 93 2 285 5 83 49 ?40      Pop fossa EDB 11 98  5 8  6 35 97 3 100 1 88 53 ?40            7 71     R Tibial - AH      Ankle AH 4 27 ?6 00 1 3 ?4 0 6 51 100 100         Pop fossa  11 72  1 1 6 15 89 2 330 7 45 44 ?40   L Tibial - AH      Ankle AH 5 10 ?6 00 4 4 ?4 0 5 57 100 100         Pop fossa AH 12 19  4 8  6 51 109 320 7 08 45 ?40       SNC      Nerve / Sites Rec  Site Onset Lat Peak Lat Ref  Amp Ref  Distance Peak Diff Ref  Velocity Ref  ms ms ms µV µV mm ms ms m/s m/s   R Median - Digit II (Antidromic)      Wrist Dig II 2 40 3 07 ?3 50 27 0 ?10 0 130   54 ?50   R Ulnar - Digit V (Antidromic)      Wrist Dig V 2 08 2 76 ?3 10 23 0 ?10 0 110   53 ?50   R Radial - Anatomical snuff box (Forearm)      Forearm Wrist 1 56 2 14 ?2 90 19 5 ?10 0 100   64 ?50   R Sural - Ankle (Calf)      Calf Ankle 3 02 3 70 ?4 00 3 1 ?6 0 140   46 ?40   L Sural - Ankle (Calf)      Calf Ankle 3 28 3 80 ?4 00 3 3 ?6 0 140   43 ?40   R Superficial peroneal - Ankle      Lat leg Ankle NR NR ?3 50 NR ?6 0 100   NR ?40   L Superficial peroneal - Ankle      Lat leg Ankle NR NR ?3 50 NR ?6 0 100   NR ?40   R Median, Ulnar - Transcarpal comparison      Median Palm Wrist 1 30 1 77 ?2 20 92 1 ?50 0 80   61       Ulnar Palm Wrist 1 30 1 77 ?2 20 29 1 ? 12 0 80   61            0 00 ?0 40     R Medial plantar, Lateral plantar - Ankle (Medial, lateral sole)      Medial plantar Sole Ankle NR NR ?3 70 NR ?2 0 140   NR ?45      Lateral plantar Sole Ankle NR NR ?3 70 NR ?2 0 140   NR ?45           NR      L Medial plantar, Lateral plantar - Ankle (Medial, lateral sole)      Medial plantar Sole Ankle 3 91 4 95 ?3 70 1 8 ?2 0 140   36 ?45      Lateral plantar Sole Ankle 2 92 3 49 ?3 70 3 0 ?2 0 140   48 ?45           1 46          EMG              Needle EMG Examination     Insertional Spontaneous MUAP   Muscle Nerve Roots Activity Fib PSW Fasc Dur  Amp Poly Config Recruitment   R  Deltoid Axillary C5-C6 Normal None None None Normal Normal None Normal Normal   R  Biceps brachii Musculocutaneous C5-C6 Normal None None None Normal Normal None Normal Normal   R   Triceps brachii Radial C6-C8 Normal None None None Normal Normal None Normal Normal   R  Pronator teres Median C6-C7 Normal None None None Normal Normal None Normal Normal   R  First dorsal interosseous Ulnar C8-T1 Normal None None None Normal Normal None Normal Normal   R  Extensor indicis proprius Radial C7-C8 Normal None None None Normal Normal None Normal Normal   L  Vastus medialis Femoral L2-L4 Normal None None None Normal Normal None Normal Normal   L  Tibialis anterior Peroneal L4-L5 Normal None None None Normal Normal None Normal Normal   L  Gastrocnemius (Medial head) Tibial S1-S2 Normal None None None Normal Normal None Normal Normal   L  Peroneus longus Perineal L5-S1 Normal None None None Normal Normal None Normal Normal   L  Gluteus medius Superior gluteal L4-S1 Normal None None None Normal Normal None Normal Normal   L  Flexor hallucis brevis Tibial S1-S2 Incr  1+ None None Normal Normal None Normal Normal         Findings:   Motor:  Left peroneal compound motor action potential (CMAP) to the extensor digitorum brevis demonstrated normal distal latency, normal amplitude, and normal conduction velocity across the fibular head and lower leg  Left tibial compound motor action potential (CMAP) to the abductor hallucis demonstrated normal distal latency, normal amplitude, and normal conduction velocity across the lower leg  Right peroneal compound motor action potential (CMAP) to the extensor digitorum brevis demonstrated normal distal latency, normal amplitude, and normal conduction velocity across the fibular head and lower leg  Right tibial compound motor action potential (CMAP) to the abductor hallucis demonstrated normal distal latency, decreased amplitude, and normal conduction velocity across the lower leg       Right median compound motor action potential (CMAP) demonstrated normal distal latency, normal amplitude, and normal conduction velocity across the forearm    Right ulnar compound motor action potential (CMAP) demonstrated normal distal latency, normal amplitude, and normal conduction velocity across the elbow and forearm  Sensory:  Left sural sensory nerve action potential (SNAP) demonstrated normal peak latency and decreased amplitude  Left superficial peroneal sensory nerve action potential (SNAP) unobtainable  Left medial plantar SNAP demonstrated prolonged peak latency and decreased amplitude  Left lateral plantar SNAP demonstrated normal peak latency and normal amplitude  Right sural sensory nerve action potential (SNAP) demonstrated normal peak latency and decreased amplitude  Right superficial peroneal sensory nerve action potential (SNAP) unobtainable  Right medial and lateral plantar SNAPs unobtainable  Right median sensory nerve action potential (SNAP) demonstrated normal amplitude and normal peak latency  Right ulnar sensory nerve action potential (SNAP) demonstrated normal amplitude and normal peak latency  Right radial sensory nerve action potential (SNAP) demonstrated normal amplitude and normal peak latency  Right median and ulnar sensory nerve action potential (SNAP) comparison study to the wrist (transcarpal) demonstrated normal latency difference  EMG:  A disposable monopolar needle was used to study selected muscles in the right upper extremity including the deltoid, biceps, triceps, pronator teres, first dorsal interosseous, and extensor indicis proprius  All muscles tested demonstrated normal insertional activity, no abnormal spontaneous activity, and normal volitional motor unit action potentials  A disposable monopolar needle was used to study selected muscles in the left lower extremity including the tibialis anterior, medial gastrocnemius, peroneus longus, vastus medialis, and gluteus medius  The left flexor hallucis brevis demonstrated abnormal spontaneous activity with positive sharp waves   All other muscles tested demonstrated normal insertional activity, no abnormal spontaneous activity, and normal volitional motor unit action potentials  Impression: Abnormal study  1  There is electrodiagnostic evidence of a right tibial axonal sensorimotor mononeuropathy across the ankle, consistent with clinical diagnosis of severe tarsal tunnel syndrome  2  There is electrodiagnostic evidence of a left tibial demyelinating sensory mononeuropathy across the ankle, primarily affecting the medial plantar branch, consistent with clinical diagnosis of mild tarsal tunnel syndrome  There is no evidence of a left lower extremity entrapment neuropathy otherwise  3  There is electrodiagnostic evidence to suggest a superimposed length-dependent, axonal, large fiber sensory peripheral polyneuropathy  4  There is no electrodiagnostic evidence of a right upper extremity entrapment mononeuropathy, brachial plexopathy, or cervical radiculopathy  5  There is no electrodiagnostic evidence of a left lumbar radiculopathy or lumbosacral plexopathy               ___________________________  Ifeoma Martinez MD

## 2018-11-27 ENCOUNTER — OFFICE VISIT (OUTPATIENT)
Dept: PHYSICAL THERAPY | Facility: CLINIC | Age: 53
End: 2018-11-27
Payer: COMMERCIAL

## 2018-11-27 DIAGNOSIS — G57.51 TARSAL TUNNEL SYNDROME OF RIGHT SIDE: Primary | ICD-10-CM

## 2018-11-27 DIAGNOSIS — G57.52 TARSAL TUNNEL SYNDROME OF LEFT SIDE: ICD-10-CM

## 2018-11-27 DIAGNOSIS — G47.33 OSA (OBSTRUCTIVE SLEEP APNEA): Primary | ICD-10-CM

## 2018-11-27 DIAGNOSIS — G57.53 TARSAL TUNNEL SYNDROME OF BOTH LOWER EXTREMITIES: ICD-10-CM

## 2018-11-27 PROCEDURE — G8979 MOBILITY GOAL STATUS: HCPCS

## 2018-11-27 PROCEDURE — G8978 MOBILITY CURRENT STATUS: HCPCS

## 2018-11-27 PROCEDURE — 97110 THERAPEUTIC EXERCISES: CPT

## 2018-11-27 PROCEDURE — 97140 MANUAL THERAPY 1/> REGIONS: CPT

## 2018-11-27 NOTE — PROGRESS NOTES
PT Re-Evaluation     Today's date: 2018  Patient name: Veronica Solis  : 1965  MRN: [id number]  Referring provider: Carolyne Mart DO  Dx:   Encounter Diagnosis     ICD-10-CM    1  Tarsal tunnel syndrome of right side G57 51 PT plan of care cert/re-cert   2  Tarsal tunnel syndrome of both lower extremities G57 53 PT plan of care cert/re-cert   3  Tarsal tunnel syndrome of left side G57 52 PT plan of care cert/re-cert       Start Time: 1530  Stop Time: 1610  Total time in clinic (min): 40 minutes    Assessment  Assessment details: Patient presents today with BL foot pain that is located inferior to medial malleolus and radiates up the calf  Pain is at best 2/10 and at worst 6/10 which is dull and achy but can become sharp  Moderate TTP at the tarsal tunnel present BL  Moderate-severe hypertonicity and length restrictions present at the gastrocs/soleus BL  Mod length restrictions also present at the HS, quads, and IT band indicated by special testing  Strength impairments present BL especially at musculature surrounding the hips and ankles  Ankle AROM/PROM limited with greatest impairments with R Dorsiflexion  Gait abnormalities include BL forefoot pronation, tibial IR and knee hyperextension present BL  Patient would benefit from skilled PT in order to reduce pain to 0/10 at best, improve strength of BL LEs to 5/5, reduce hypertonicity and length restrictions present in BL LEs, address gait abnormalities, and improve pain free AROM/PROM to [facility] in order to stand for long durations, ambulate, ascend/descend stairs, exercise, go on hikes, and 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  18    Since beginning PT patient has made improvements in AROM and PROM of BL ankles, by 5-10 degrees or greater  Continues to have pain with ROM bilaterally, which contributes to ROM restrictions as well as muscular tightness   Reduction of swelling in BL LEs indicated by girth measurements  Improvements in LE strength bilaterally as well as ankle stabilizers, but still has weakness present especially at BL hips and BL ankles contributing to continuing gait abnormalities and pain  Reduction of hypertonicity and length restrictions at the gastrocs/soleus to moderate levels BL  Continues to have mod length restrictions also present at the HS, quads, and IT band indicated by special testing  Continues to have constant pain that is at worst 6/10 and avoids aggravating activities  Patient would benefit from skilled PT in order to reduce pain levels, improve ROM to WNL in all planes, improve strength to 4+ or 5 in BL LEs,  Address gait abnormalities and reduce hypertonicity and length restrictions in order to stand and ambulate for long periods of time, exercise, ambulate stairs, complete ADLs and household chores requiring standing and walking and 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  Impairments: abnormal gait, abnormal muscle firing, abnormal muscle tone, abnormal or restricted ROM, abnormal movement, impaired balance, impaired physical strength, lacks appropriate home exercise program, pain with function and weight-bearing intolerance    Symptom irritability: highUnderstanding of Dx/Px/POC: good   Prognosis: good    Goals  STGs  1  Reduce pain levels to 0/10 at best -NOT MET  2  Improve strength of BL LEs by 1/4-1/2 grade  -MET  3  Improve ankle ROM by 5 degrees so she can ambulate with functional motion available  -MET  4  Complete 3 sets of step ups pain free to progress to ambulating stairs  -NOT MET    LTGs  1  Reduce pain levels to constant 0/10-NOT MET  2  Improve strength of BL LEs to 4+ or greater out of 5 so she can ambulate and stand for long durations while teaching as a -PARTIALLY MET  3   Improve ROM of BL ankles to WNL for all planes so she can walk without limitations due to motion availability-PARTIALLY MET  4  Ascend and descend 10 flights of stairs symptom free in order to safely ambulate stairs at home and in the community  -NOT MET    Plan  Patient would benefit from: skilled physical therapy and PT eval  Planned modality interventions: manual electrical stimulation, TENS, thermotherapy: hydrocollator packs, ultrasound and unattended electrical stimulation  Planned therapy interventions: abdominal trunk stabilization, joint mobilization, manual therapy, neuromuscular re-education, orthotic fitting/training, patient education, orthotic management and training, postural training, strengthening, stretching, therapeutic activities, therapeutic exercise, home exercise program, graded motor, graded exercise, gait training, flexibility, balance/weight bearing training and balance  Frequency: 2x week  Duration in weeks: 10  Plan of Care beginning date: 10/25/2018  Plan of Care expiration date: 1/3/2019  Treatment plan discussed with: patient        Subjective Evaluation    History of Present Illness  Date of onset: 12/25/2016  Mechanism of injury: Patient reports chronic foot problems and has had 4 foot surgeries  Including buyon and tendon transfers  Since her last surgery  ( December 2016)she  has not felt good, & found out she has mod-severe nerve damage in her feet  Told she might need tarsal tunnel surgery by orthopedic and also referred her to neurologist  Saw neurologist who told her the nerve damage is not reversible and doesn't recommend getting surgery  Is getting another EMG done in November on the other foot  Cant wear shoes, cant stand longer than 15 min  She goes to the gym but feels like it isnt helping  Can not walk stairs like a normal person  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  Has Hx of tendon rupture in the R and had a tendon transfer in the L to prevent L side from rupturing  N/T present in feet as well as shooting pain into foot and shoots up to buttocks  18  Patient reported that she had her EMG yesterday and the neurologist stated that she had signs of peripheral neuropathy  Since beginning PT patient stated that she feels she has improved by 30%, but she continues to avoid aggravating activities such as standing or walking for long durations  States that her pain is still 6/10 at worst when she walks and stands on it for long durations     Recurrent probem    Quality of life: good    Pain  No pain reported  Current pain ratin  At best pain ratin  At worst pain ratin  Location: Medial ankle into calf BL  Quality: dull ache and sharp  Relieving factors: ice, medications and rest  Aggravating factors: standing, walking, stair climbing and running    Social Support  Steps to enter house: yes  Stairs in house: yes   Lives in: multiple-level home  Lives with: spouse    Employment status: working    Diagnostic Tests  EMG: abnormal  Treatments  Previous treatment: physical therapy and medication  Current treatment: physical therapy  Patient Goals  Patient goals for therapy: decreased pain, improved balance, increased motion, return to work, independence with ADLs/IADLs, increased strength and return to sport/leisure activities  Patient goal: be able to stand, walk, ambulate stairs, go on hikes, and work as a teacher symptom free        Objective     Active Range of Motion   Left Ankle/Foot   Dorsiflexion (ke): 5 degrees   Dorsiflexion (kf): 0 degrees   Plantar flexion: 55 degrees with pain  Inversion: 30 degrees with pain  Eversion: 20 degrees with pain    Right Ankle/Foot   Dorsiflexion (ke): 0 degrees with pain  Dorsiflexion (kf): 0 degrees with pain  Plantar flexion: 60 degrees   Inversion: 30 degrees with pain  Eversion: 30 degrees     Passive Range of Motion   Left Ankle/Foot    Dorsiflexion (ke): 10 degrees   Dorsiflexion (kf): 5 degrees with pain  Plantar flexion: 55 degrees with pain  Inversion: 30 degrees with pain  Eversion: 20 degrees with pain    Right Ankle/Foot  Normal passive range of motion  Dorsiflexion (ke): 5 degrees with pain  Dorsiflexion (kf): 0 degrees with pain   Plantar flexion: 60 degrees with pain  Inversion: 35 degrees with pain  Eversion: 30 degrees with pain    Strength/Myotome Testing     Left Hip   Normal muscle strength  Planes of Motion   Flexion: 4  Extension: 4-  Abduction: 4  External rotation: 4  Internal rotation: 4+    Right Hip   Planes of Motion   Flexion: 4  Extension: 4-  Abduction: 4  External rotation: 4  Internal rotation: 4    Left Knee   Flexion: 4+  Extension: 5    Right Knee   Flexion: 4+  Extension: 5    Left Ankle/Foot   Dorsiflexion: 4+  Plantar flexion: 4+  Inversion: 4+  Eversion: 4+    Right Ankle/Foot   Dorsiflexion: 4  Plantar flexion: 4  Inversion: 4  Eversion: 4    Swelling   Left Ankle/Foot   Figure 8: 51 6 cm    Right Ankle/Foot   Figure 8: 51 4 cm    Ambulation     Comments   Gait:  Forefoot pronation BL  Tibial IR BL  Knee Hyperextensoin BL    11/27/18  Forefoot pronation BL  Tibial IR BL  Knee Hyperextensoin BL    General Comments     Lumbar Comments  LE Sensation: Normal BL with light touch (plan to test with monofilaments next session)    Ankle/Foot Comments   HS Flexibility:  (-30) BL  Tarsal Tunnel:  (+) BL     Bob Test:  (+) BL    Hemalatha Test:   (+) BL      Flowsheet Rows      Most Recent Value   PT/OT G-Codes   Current Score  58   Projected Score  65   FOTO information reviewed  Yes   Assessment Type  Re-evaluation   G code set  Mobility: Walking & Moving Around   Mobility: Walking and Moving Around Current Status ()  CK   Mobility: Walking and Moving Around Goal Status ()  CJ          Precautions: Standard      Daily Treatment Diary     Manual  11/27/18            STM Calf musculature, foot intrinsics, BL            Gastrocs/soleus stretch 3 x 30" each                                                       Exercise Diary  11/27/18            HEP review 5'            Strength/ROM testing 25'                                                                                                                                                                                                                                                          Modalities  11/27            [facility] 10' pre tx

## 2018-11-28 ENCOUNTER — TELEPHONE (OUTPATIENT)
Dept: NEUROLOGY | Facility: CLINIC | Age: 53
End: 2018-11-28

## 2018-11-28 NOTE — TELEPHONE ENCOUNTER
----- Message from Malauzai SoftwareDO sent at 11/28/2018 10:20 AM EST -----  Regarding: emg results  Please let the patient know that in reviewing her current EMG results and comparing it with 10/2017, they appear similar- she has severe tarsal tunnel on the right and mild demyelinating on the left      If she has any questions, please let me know    Thanks  ----- Message -----  From: Chelsie Dennis MD  Sent: 11/26/2018  10:34 AM  To: Ana Paula SAFCell DO Arron

## 2018-11-28 NOTE — TELEPHONE ENCOUNTER
Called and Left a message on pt's answering machine for a call back  Both numbers listed were attempted

## 2018-11-29 ENCOUNTER — OFFICE VISIT (OUTPATIENT)
Dept: PHYSICAL THERAPY | Facility: CLINIC | Age: 53
End: 2018-11-29
Payer: COMMERCIAL

## 2018-11-29 ENCOUNTER — TELEPHONE (OUTPATIENT)
Dept: SLEEP CENTER | Facility: CLINIC | Age: 53
End: 2018-11-29

## 2018-11-29 ENCOUNTER — TELEPHONE (OUTPATIENT)
Dept: NEUROLOGY | Facility: CLINIC | Age: 53
End: 2018-11-29

## 2018-11-29 DIAGNOSIS — G57.52 TARSAL TUNNEL SYNDROME OF LEFT SIDE: ICD-10-CM

## 2018-11-29 DIAGNOSIS — G57.51 TARSAL TUNNEL SYNDROME OF RIGHT SIDE: Primary | ICD-10-CM

## 2018-11-29 DIAGNOSIS — G57.53 TARSAL TUNNEL SYNDROME OF BOTH LOWER EXTREMITIES: ICD-10-CM

## 2018-11-29 PROCEDURE — 97140 MANUAL THERAPY 1/> REGIONS: CPT

## 2018-11-29 PROCEDURE — 97110 THERAPEUTIC EXERCISES: CPT

## 2018-11-29 NOTE — TELEPHONE ENCOUNTER
Advised patient Home sleep study shows BILLY  Treatment with Apap ordered  DME set up scheduled for 12/27/2018 RX and paperwork to [facility]

## 2018-11-29 NOTE — PROGRESS NOTES
Daily Note     Today's date: 2018  Patient name: Andrew Barrett  : 1965  MRN: [id number]  Referring provider: Arnaldo Spann DO  Dx:   Encounter Diagnosis     ICD-10-CM    1  Tarsal tunnel syndrome of right side G57 51    2  Tarsal tunnel syndrome of both lower extremities G57 53    3  Tarsal tunnel syndrome of left side G57 52        Start Time: 1545  Stop Time: 1630  Total time in clinic (min): 45 minutes    Subjective: Patient reports that her BL foot pain today is 7/10 because yesterday she had to climb up and down in attic to bring decorations up and bring down other decorations  Reduced to 1/10 after manual tx and maintained at 1/10 during exercises  Objective: See treatment diary below    Precautions: Standard      Daily Treatment Diary     Manual  10/30 11/6 11/8/18 11/23 11/29/18        STM Calf musculature and foot instrinsics 6 min  Calf musculature and foot instrinsics Calf musculature and foot instrinsics Calf musculature and foot instrinsics Calf musculature and foot instrinsics        A-P; M-L Ankle Joint Mobs  Grade II-III Grade II-III Grade II-III Grade II-III        Gastrocs Manual stretching  2 min each 2 min each 2 min each 2 min each        Metatarsals Mob     Grade II-III              12'            Exercise Diary  10/30 11/6/18 11/8/18 11/23/18 11/29        Tibial N  Glide             Gastrocs /Soleus Stretch 10 x 10" each gastrocs; soleus only on the R            Seated HR/TR 1 x 10each             Towel Scrunches 2 min each 2 min each 2 min each 2 min each         Ankle 4 Way  YB 1 x 10 each direction BL  YB 1 x 10 each direction BL         Pro Stretch  3 x 30" BL  6x 10" BL          Golf Ball Massage 1 min each 2 min each 2 min each 2 min each         FTEO Firm             FTEC Foam             NuStep  10 min L1 10 min L1 6' L 1                      Seated N  [place]  20 x's BL  15x BL         Wobble Board   20x forward and back/ 20x ML           BAPS   20x CW; 20 x CW Toe Taps   15 x each 15 x each         [place]     2 min          Plantar Fascia Stretch    6 x 10" each         Ankle TB 4 way    RTB 2 x 10 each RTB 2 x 10 each        Bridges     With ball squeeze and PPT 2 x 12 with 3" hold        LAQ     2 x 12 each LE 3#         HS Curls             Standing Hip Abduction/Extension     BTB Hip abduction 6 laps        TB squats     BTB 2 x 15         Clamshells     GTB 2 x 10 each 3" hold            Modalities  11/29/18            [facility] 6'                                          Assessment: Tolerated treatment well  Added hip and knee strengthening exercises in order to promote stability up the LE chain to prevent compensatory mechanisms and poor mechanics at the ankle and feet  Reduction of pain with manual tx which maintained at reduced level through out session  Patient would benefit from continued PT in order to continue core and LE strengthening to promote good mechanics and prevent compensatory mechanisms at the ankle and foot  Plan: Continue per plan of care

## 2019-01-01 NOTE — PROGRESS NOTES
Daily Note     Today's date: 2018  Patient name: Veronica Mandujano  : 1965  MRN: [id number]  Referring provider: Jacqui Carrasco DO  Dx:   Encounter Diagnosis     ICD-10-CM    1  Tarsal tunnel syndrome of right side G57 51    2  Tarsal tunnel syndrome of left side G57 52        Start Time: 1615  Stop Time: 1705  Total time in clinic (min): 50 minutes    Subjective: No chnages in symptoms since her last visit  Objective: See treatment diary below    Precautions: Standard  Daily Treatment Diary     Manual  10/30 11/6           STM Calf musculature and foot instrinsics 6 min  Calf musculature and foot instrinsics           A-P; M-L Ankle Joint Mobs  Grade II-III           Gastrocs Manual stretching  2 min each                                         Exercise Diary  10/30 11/6/18           Tibial N  Glide             Gastrocs /Soleus Stretch 10 x 10" each gastrocs; soleus only on the R            Seated HR/TR 1 x 10each             Towel Scrunches 2 min each 2 min each           Ankle 4 Way  YB 1 x 10 each direction BL           Pro Stretch  3 x 30" BL           Golf Ball Massage 1 min each 2 min each           FTEO Firm             FTEC Foam             NuStep  10 min L1                        Seated N  [place]  20 x's BL                                                                                                                       Modalities                                                           Assessment: Tolerated treatment well  Increase in YTB ankle inversion with L foot so holding on left, as well as pain after pro stretch on L  Patient would benefit from continued PT in order to continue to address BL foot pain  Plan: Continue per plan of care 
Normal superficial inspection and palpation of back and vertebral bodies.

## 2019-01-07 ENCOUNTER — OFFICE VISIT (OUTPATIENT)
Dept: NEUROLOGY | Facility: CLINIC | Age: 54
End: 2019-01-07
Payer: COMMERCIAL

## 2019-01-07 VITALS
HEIGHT: 68 IN | HEART RATE: 81 BPM | SYSTOLIC BLOOD PRESSURE: 120 MMHG | BODY MASS INDEX: 37.13 KG/M2 | RESPIRATION RATE: 14 BRPM | DIASTOLIC BLOOD PRESSURE: 80 MMHG | WEIGHT: 245 LBS

## 2019-01-07 DIAGNOSIS — G62.9 SENSORY NEUROPATHY: ICD-10-CM

## 2019-01-07 DIAGNOSIS — M79.641 RIGHT HAND PAIN: ICD-10-CM

## 2019-01-07 DIAGNOSIS — G57.53 TARSAL TUNNEL SYNDROME OF BOTH LOWER EXTREMITIES: Primary | ICD-10-CM

## 2019-01-07 PROCEDURE — 99214 OFFICE O/P EST MOD 30 MIN: CPT | Performed by: PSYCHIATRY & NEUROLOGY

## 2019-01-07 NOTE — PROGRESS NOTES
Patient ID: Ami Sweet is a 48 y o  female  Assessment/Plan:    No problem-specific Assessment & Plan notes found for this encounter  Diagnoses and all orders for this visit:    Tarsal tunnel syndrome of both lower extremities  Repeat EMG showed 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 affecting the medial plantar branch  Also present is a possible 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  We discussed that left tarsal tunnel release surgery may be helpful with her symptoms as no significant axon damage  Right hand pain- wrist brace nightly, short course of steroids can be an option    Sensory neuropathy  - Blood work ordered last visit pending- further recommendations pending this  HgbA1C, Rf,NIMESH, B6,SPEP, B12, folate       Follow up in six months time  Subjective:    HPI    Ms  Charlie Kamara is a pleasant 47 yo female seen in follow up today  Since last seen: similar symptoms  Repeat EMG showed 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 affecting the medial plantar branch  Also present is a possible 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  States is having hand pain radiating to the elbow in the right hand with swelling initially  States harder to  but more due to pain- dull achy with occasional sharp stabbing pain  States does not wake her in the middle of the night  States does therapy finished early December which is helpful to her balance, sent to PT with balance last visit  Denies new bowel or bladder control issues  No numbness or tingling  States steroids not helpful    Initial consultation for significant foot pain   States several foot surgeries over time due to flat feet then easy achilles rupture and other joint issues- starting late 35s      States she was walking and ruptured right flexor hallucis tendon and another "calf tendon"  States she has weakness with plantarflexion - demonstrated this  States has pain in the inside of the ankles for the last two to three years ast least with all the foot surgeries  States the surgeries overall were successful but her pain in the inner side of her ankles continue to be severe  She had EMG done 10/2017 with 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  Left with mild demyelinating tibial neuropathy  States any activity is challenging as cannot stand over 15-20 minutes without a sharp shooting pain  Cannot go hiking or wear most shoes  Pain severity-- 5+ every day for years  No balance issues or falls  Does have right sharp shooting pain starting with weakness in right thumb going up the lateral aspect of the right hand  States increasing weakness over the month  States does not wake her in the middle of the night  States had an odd uncomfortable sensation at the base of he neck all the time all summer improved with massage etc       States had left hand pain and weakness with tingling sensation in the left arm years ago that resolved over time with minimal symptoms now  States did wear a brace- 5-7 years ago      No known spine issues per her  Had significant joint laxity when younger but no longer  Denies other health issues, states no cardiac issues personal or family  No DM hx, is on synthroid- does have pituitary adenoma on MRI- followed by endocrinology, no gastric absorption issues     Other history vertigo- improved with vestibular therapy  States mom had neuropathy in her 46s- from the low back per her  Denies family hx connective tissue disorders    States brother passed away from suspected thyroid storm,   Other hx pituitary adenoma      States tobacco use now- stopped five years ago  No alcohol use      The following portions of the patient's history were reviewed and updated as appropriate: allergies, current medications, past family history, past medical history, past social history, past surgical history and problem list          Objective:    Blood pressure 120/80, pulse 81, resp  rate 14, height 5' 8" (1 727 m), weight 111 kg (245 lb)  Physical Exam   Constitutional: She is oriented to person, place, and time  She appears well-developed and well-nourished  HENT:   Head: Normocephalic and atraumatic  Eyes: Pupils are equal, round, and reactive to light  Neck: Normal range of motion  Cardiovascular: Normal rate and regular rhythm  Pulmonary/Chest: Effort normal    Abdominal: Soft  Musculoskeletal: She exhibits no tenderness  Reduced ROM b/l feet chronic   Neurological: She is alert and oriented to person, place, and time  Coordination normal    Reflex Scores:       Achilles reflexes are 0 on the left side  Nursing note and vitals reviewed  Neurological Exam  Mental Status  Alert  Oriented to person, place, time and situation  Recent and remote memory are intact  no dysarthria present  Unable to repeat  Attention and concentration are normal     Cranial Nerves  CN II: Visual fields full to confrontation  CN III, IV, VI: Extraocular movements intact bilaterally  Pupils equal round and reactive to light bilaterally  CN V: Facial sensation is normal   CN VII: Full and symmetric facial movement  CN VIII: Hearing is normal   CN IX, X: Palate elevates symmetrically  Normal gag reflex  CN XI: Shoulder shrug strength is normal   CN XII: Tongue midline without atrophy or fasciculations  Motor   Normal muscle tone  Strength is 5/5 in all four extremities except as noted  4+5 right foot plantar flexion with 3-/5 right toe flexion  Left foot strength 5/5  Marcos [employer]inoton Sensory  Sensation is intact to light touch, pinprick, vibration and proprioception in all four extremities  Light touch is normal in upper and lower extremities  Temperature is normal in upper and lower extremities   Vibration is normal in upper and lower extremities  No right-sided hemispatial neglect  No left-sided hemispatial neglect  Reflexes  Deep tendon reflexes are 2+ and symmetric except as noted  Right                      Left  Achilles                                                       0    Coordination  Finger-to-nose, rapid alternating movements and heel-to-shin normal bilaterally without dysmetria  Gait  Walks with left foot brace (s/p surgery) wide based normal stride length no ataxia             ROS:    Review of Systems   Constitutional: Negative  Negative for appetite change and fever  HENT: Negative  Negative for hearing loss, tinnitus, trouble swallowing and voice change  Eyes: Negative  Negative for photophobia and pain  Respiratory: Negative  Negative for shortness of breath  Cardiovascular: Negative  Negative for palpitations  Gastrointestinal: Negative  Negative for nausea and vomiting  Endocrine: Negative  Negative for cold intolerance and heat intolerance  Genitourinary: Negative  Negative for dysuria, frequency and urgency  Musculoskeletal: Negative  Negative for myalgias and neck pain  Skin: Negative  Negative for rash  Neurological: Negative  Negative for dizziness, tremors, seizures, syncope, facial asymmetry, speech difficulty, weakness, light-headedness, numbness and headaches  Hematological: Negative  Does not bruise/bleed easily  Psychiatric/Behavioral: Negative  Negative for confusion, hallucinations and sleep disturbance

## 2019-01-07 NOTE — LETTER
January 21, 2019     Renette Koyanagi, [address] [facility] [address] [address]    Patient: Shari Ceja   YOB: 1965   Date of Visit: 1/7/2019       Dear Dr Arlen Lujan Recipients: Thank you for referring Gonslao Collado to me for evaluation  Below are my notes for this consultation  If you have questions, please do not hesitate to call me  I look forward to following your patient along with you  Sincerely,        Gabbie Farah DO        CC: No Recipients  Gabbie Farah DO  1/7/2019  4:02 PM  Sign at close encounter  Patient ID: Shari Ceja is a 48 y o  female  Assessment/Plan:    No problem-specific Assessment & Plan notes found for this encounter  [id number]}       Subjective:    HPI    Ms Gonsalo Collado is a pleasant 49 yo female seen in consultation for significant foot pain  States several foot surgeries over time due to flat feet then easy achilles rupture and other joint issues- starting late 30s      States she was walking and ruptured right flexor hallucis tendon and another "calf tendon"  States she has weakness with plantarflexion - demonstrated this  States has pain in the inside of the ankles for the last two to three years ast least with all the foot surgeries  States the surgeries overall were successful but her pain in the inner side of her ankles continue to be severe  She had EMG done 10/2017 with 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  Left with mild demyelinating tibial neuropathy  States any activity is challenging as cannot stand over 15-20 minutes without a sharp shooting pain  Cannot go hiking or wear most shoes  Pain severity-- 5+ every day for years  No balance issues or falls  Does have right sharp shooting pain starting with weakness in right thumb going up the lateral aspect of the right hand  States increasing weakness over the month  States does not wake her in the middle of the night   States had an odd uncomfortable sensation at the base of he neck all the time all summer improved with massage etc       States had left hand pain and weakness with tingling sensation in the left arm years ago that resolved over time with minimal symptoms now  States did wear a brace- 5-7 years ago      No known spine issues per her  Had significant joint laxity when younger but no longer  Denies other health issues, states no cardiac issues personal or family  No DM hx, is on synthroid- does have pituitary adenoma on MRI- followed by endocrinology, no gastric absorption issues     Other history vertigo- improved with vestibular therapy  States mom had neuropathy in her 46s- from the low back per her  Denies family hx connective tissue disorders  States brother passed away from suspected thyroid storm,   Other hx pituitary adenoma      States tobacco use now- stopped five years ago  No alcohol use    Since last seen: similar symptoms  Repeat EMG showed 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 affecting the medial plantar branch  Also present is a possible 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  The following portions of the patient's history were reviewed and updated as appropriate: allergies, current medications, past family history, past medical history, past social history, past surgical history and problem list          Objective: There were no vitals taken for this visit  Physical Exam    Neurological Exam      ROS:    Review of Systems   Constitutional: Negative  Negative for appetite change and fever  HENT: Negative  Negative for hearing loss, tinnitus, trouble swallowing and voice change  Eyes: Negative  Negative for photophobia and pain  Respiratory: Negative  Negative for shortness of breath  Cardiovascular: Negative  Negative for palpitations  Gastrointestinal: Negative  Negative for nausea and vomiting  Endocrine: Negative  Negative for cold intolerance and heat intolerance  Genitourinary: Negative  Negative for dysuria, frequency and urgency  Musculoskeletal: Negative  Negative for myalgias and neck pain  Skin: Negative  Negative for rash  Neurological: Negative  Negative for dizziness, tremors, seizures, syncope, facial asymmetry, speech difficulty, weakness, light-headedness, numbness and headaches  Hematological: Negative  Does not bruise/bleed easily  Psychiatric/Behavioral: Negative  Negative for confusion, hallucinations and sleep disturbance

## 2019-01-07 NOTE — LETTER
January 21, 2019     No Recipients    Patient: Shari Ceja   YOB: 1965   Date of Visit: 1/7/2019       Dear Dr Arlen Lujan Recipients: Thank you for referring Gonsalo Collado to me for evaluation  Below are my notes for this consultation  If you have questions, please do not hesitate to call me  I look forward to following your patient along with you  Sincerely,        Gabbie Farah DO        CC: No Recipients  Gabbie Farah DO  1/7/2019  4:17 PM  Sign at close encounter  Patient ID: Shari Ceja is a 48 y o  female  Assessment/Plan:    No problem-specific Assessment & Plan notes found for this encounter  {Assess/PlanSmartLinks:[id number]}       Subjective:    HPI    Ms Gonsalo Collado is a pleasant 47 yo female seen in consultation for significant foot pain  States several foot surgeries over time due to flat feet then easy achilles rupture and other joint issues- starting late 30s      States she was walking and ruptured right flexor hallucis tendon and another "calf tendon"  States she has weakness with plantarflexion - demonstrated this  States has pain in the inside of the ankles for the last two to three years ast least with all the foot surgeries  States the surgeries overall were successful but her pain in the inner side of her ankles continue to be severe  She had EMG done 10/2017 with 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  Left with mild demyelinating tibial neuropathy  States any activity is challenging as cannot stand over 15-20 minutes without a sharp shooting pain  Cannot go hiking or wear most shoes  Pain severity-- 5+ every day for years  No balance issues or falls  Does have right sharp shooting pain starting with weakness in right thumb going up the lateral aspect of the right hand  States increasing weakness over the month  States does not wake her in the middle of the night   States had an odd uncomfortable sensation at the base of he neck all the time all summer improved with massage etc       States had left hand pain and weakness with tingling sensation in the left arm years ago that resolved over time with minimal symptoms now  States did wear a brace- 5-7 years ago      No known spine issues per her  Had significant joint laxity when younger but no longer  Denies other health issues, states no cardiac issues personal or family  No DM hx, is on synthroid- does have pituitary adenoma on MRI- followed by endocrinology, no gastric absorption issues     Other history vertigo- improved with vestibular therapy  States mom had neuropathy in her 46s- from the low back per her  Denies family hx connective tissue disorders  States brother passed away from suspected thyroid storm,   Other hx pituitary adenoma      States tobacco use now- stopped five years ago  No alcohol use    Since last seen: similar symptoms  Repeat EMG showed 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 affecting the medial plantar branch  Also present is a possible 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  States is having hand pain radiating to the elbow in the right hand with swelling initially  States harder to  but more due to pain- dull achy with occasional sharp stabbing pain  States does not wake her in the middle of the night  States does therapy finished early December which is helpful to her balance, sent to PT with balance last visit  Denies new bowel or bladder control issues  No numbness or tingling  States steroids not helpful  States PCP  The following portions of the patient's history were reviewed and updated as appropriate: allergies, current medications, past family history, past medical history, past social history, past surgical history and problem list          Objective: There were no vitals taken for this visit      Physical Exam    Neurological Exam      ROS:    Review of Systems   Constitutional: Negative  Negative for appetite change and fever  HENT: Negative  Negative for hearing loss, tinnitus, trouble swallowing and voice change  Eyes: Negative  Negative for photophobia and pain  Respiratory: Negative  Negative for shortness of breath  Cardiovascular: Negative  Negative for palpitations  Gastrointestinal: Negative  Negative for nausea and vomiting  Endocrine: Negative  Negative for cold intolerance and heat intolerance  Genitourinary: Negative  Negative for dysuria, frequency and urgency  Musculoskeletal: Negative  Negative for myalgias and neck pain  Skin: Negative  Negative for rash  Neurological: Negative  Negative for dizziness, tremors, seizures, syncope, facial asymmetry, speech difficulty, weakness, light-headedness, numbness and headaches  Hematological: Negative  Does not bruise/bleed easily  Psychiatric/Behavioral: Negative  Negative for confusion, hallucinations and sleep disturbance

## 2019-01-25 LAB
ALBUMIN SERPL ELPH-MCNC: 3.9 G/DL (ref 2.9–4.4)
ALBUMIN/GLOB SERPL: 1.3 {RATIO} (ref 0.7–1.7)
ALPHA1 GLOB SERPL ELPH-MCNC: 0.2 G/DL (ref 0–0.4)
ALPHA2 GLOB SERPL ELPH-MCNC: 0.7 G/DL (ref 0.4–1)
ANA HOMOGEN TITR SER: ABNORMAL {TITER}
ANA SPECKLED TITR SER: ABNORMAL {TITER}
ANA TITR SER IF: POSITIVE {TITER}
B-GLOBULIN SERPL ELPH-MCNC: 1 G/DL (ref 0.7–1.3)
EST. AVERAGE GLUCOSE BLD GHB EST-MCNC: 120 MG/DL
FOLATE SERPL-MCNC: 10.1 NG/ML
GAMMA GLOB SERPL ELPH-MCNC: 1 G/DL (ref 0.4–1.8)
GLOBULIN SER CALC-MCNC: 2.9 G/DL (ref 2.2–3.9)
HBA1C MFR BLD: 5.8 % (ref 4.8–5.6)
LABORATORY COMMENT REPORT: NORMAL
M PROTEIN SERPL ELPH-MCNC: NORMAL G/DL
PROT SERPL-MCNC: 6.8 G/DL (ref 6–8.5)
RHEUMATOID FACT SERPL-ACNC: <10 IU/ML (ref 0–13.9)
SL AMB NOTE:: ABNORMAL
VIT B1 BLD-SCNC: 114.2 NMOL/L (ref 66.5–200)
VIT B12 SERPL-MCNC: 463 PG/ML (ref 232–1245)
VIT B6 SERPL-MCNC: 16.7 UG/L (ref 2–32.8)

## 2019-01-30 ENCOUNTER — TELEPHONE (OUTPATIENT)
Dept: NEUROLOGY | Facility: CLINIC | Age: 54
End: 2019-01-30

## 2019-01-30 DIAGNOSIS — G62.9 SENSORY NEUROPATHY: ICD-10-CM

## 2019-01-30 DIAGNOSIS — R76.8 POSITIVE ANA (ANTINUCLEAR ANTIBODY): Primary | ICD-10-CM

## 2019-01-30 NOTE — TELEPHONE ENCOUNTER
Pt called re abnormal NIMESH  Came back positive  Please review and advise  Pt cell [phone]  Ok to leave detailed message

## 2019-01-30 NOTE — TELEPHONE ENCOUNTER
Indicates possible rheumatologic problem  I would like her to see rheumatology  Will order if she is agreeable  May be reason for sxs  Also Ha1c slightly high  Will discuss this at f/u  Thanks

## 2019-02-07 DIAGNOSIS — G62.9 NEUROPATHY: ICD-10-CM

## 2019-02-07 DIAGNOSIS — R76.8 ANA POSITIVE: Primary | ICD-10-CM

## 2019-02-08 ENCOUNTER — TELEPHONE (OUTPATIENT)
Dept: NEUROLOGY | Facility: CLINIC | Age: 54
End: 2019-02-08

## 2019-02-08 NOTE — TELEPHONE ENCOUNTER
----- Message from [address], DO sent at 2/7/2019  5:47 PM EST -----  Please let patient know that her NIMESH titers are high- this is concerning for possible autoimmune condition- may just need monitoring or further work up, can sometimes be false positive, regardless would recommend seeing rheumatology for further recommendations   Placing referral  Thanks

## 2019-02-26 ENCOUNTER — OFFICE VISIT (OUTPATIENT)
Dept: SLEEP CENTER | Facility: CLINIC | Age: 54
End: 2019-02-26
Payer: COMMERCIAL

## 2019-02-26 VITALS
WEIGHT: 252 LBS | DIASTOLIC BLOOD PRESSURE: 72 MMHG | SYSTOLIC BLOOD PRESSURE: 130 MMHG | HEIGHT: 68 IN | BODY MASS INDEX: 38.19 KG/M2

## 2019-02-26 DIAGNOSIS — G47.33 OSA (OBSTRUCTIVE SLEEP APNEA): Primary | ICD-10-CM

## 2019-02-26 PROCEDURE — 99214 OFFICE O/P EST MOD 30 MIN: CPT | Performed by: INTERNAL MEDICINE

## 2019-02-26 NOTE — PROGRESS NOTES
Progress Note - [address] :1965 MRN: [id number]      Reason for Visit:  48 y  o female here for PAP compliance check    Assessment:  Doing well with new PAP device  Sleep quality is improved and the patient feels less drowsy  Compliance data show utilization for greater than or equal to 70% of nights, for greater than or equal to 4 hours per night  Plan:  Adequate compliance and successful treatment    Follow up: One year    History of Present Illness:  History of BILLY on PAP therapy  Meets adequate compliance  Review of Systems      Genitourinary need to urinate more than twice a night and post menopausal (no peroids)   Cardiology none   Gastrointestinal none   Neurology none   Constitutional none   Integumentary none   Psychiatry none   Musculoskeletal none   Pulmonary none   ENT none   Endocrine none   Hematological none           I have reviewed and updated the review of systems as necessary    Historical Information    Past Medical History:   Past Medical History:   Diagnosis Date    Hypertension          Past Surgical History:   Past Surgical History:   Procedure Laterality Date    CHOLECYSTECTOMY      FOOT SURGERY      TONSILLECTOMY               Family History:   Family History   Problem Relation Age of Onset    Lung cancer Mother     Hypertension Maternal Grandmother     Diabetes Maternal Grandmother        Medications/Allergies:      Current Outpatient Medications:     levothyroxine 25 mcg tablet, Take 25 mcg by mouth daily  , Disp: , Rfl: 5    metoprolol succinate (TOPROL-XL) 25 mg 24 hr tablet, Take 25 mg by mouth daily  , Disp: , Rfl: 2    traZODone (DESYREL) 50 mg tablet, , Disp: , Rfl:       Objective    Vital Signs:   Vitals:    19 1500   BP: 130/72     [place] Sleepiness Scale:  Total score: 2        Physical Exam:    General: Alert, appropriate, cooperative, overweight    Head: NC/AT    Skin: Warm, dry    Neuro: No motor abnormalities, cranial nerves appear intact    Extremity: No clubbing, cyanosis    PAP setting:   APAP 4 to 20 cm  DME Provider: [employer]  Test results: Moderate BILLY (respiratory event index = 15 3)    Counseling / Coordination of Care  Total clinic time spent today 25 minutes  A description of the counseling / coordination of care: Maintain or improve compliance  KIARA Hathaway    Board Certified Sleep Specialist

## 2019-02-28 ENCOUNTER — TELEPHONE (OUTPATIENT)
Dept: NEUROLOGY | Facility: CLINIC | Age: 54
End: 2019-02-28

## 2019-02-28 NOTE — TELEPHONE ENCOUNTER
[facility] from [address] rheumatology called requesting copy of pt's most recent labs be faxed to their office @ [phone]  Fax completed

## 2019-03-29 ENCOUNTER — TELEPHONE (OUTPATIENT)
Dept: NEUROLOGY | Facility: CLINIC | Age: 54
End: 2019-03-29

## 2019-03-29 NOTE — TELEPHONE ENCOUNTER
Received genedx form from Dr Jorge Stanford, ordering test 670 signed by both patient and Dr Jorge Stanford  The form, insurance cards, and office notes were emailed to Teo Millers at [place] to look into what the [facility] Cost is for the patient

## 2020-02-10 ENCOUNTER — OFFICE VISIT (OUTPATIENT)
Dept: URGENT CARE | Facility: CLINIC | Age: 55
End: 2020-02-10
Payer: COMMERCIAL

## 2020-02-10 VITALS
SYSTOLIC BLOOD PRESSURE: 146 MMHG | TEMPERATURE: 98 F | HEIGHT: 68 IN | OXYGEN SATURATION: 96 % | WEIGHT: 255 LBS | RESPIRATION RATE: 18 BRPM | HEART RATE: 83 BPM | BODY MASS INDEX: 38.65 KG/M2 | DIASTOLIC BLOOD PRESSURE: 96 MMHG

## 2020-02-10 DIAGNOSIS — H92.02 EARACHE ON LEFT: ICD-10-CM

## 2020-02-10 DIAGNOSIS — R09.89 CHEST CONGESTION: Primary | ICD-10-CM

## 2020-02-10 PROCEDURE — 99213 OFFICE O/P EST LOW 20 MIN: CPT | Performed by: PREVENTIVE MEDICINE

## 2020-02-10 RX ORDER — AMOXICILLIN AND CLAVULANATE POTASSIUM 875; 125 MG/1; MG/1
1 TABLET, FILM COATED ORAL EVERY 12 HOURS SCHEDULED
Qty: 14 TABLET | Refills: 0 | Status: SHIPPED | OUTPATIENT
Start: 2020-02-10 | End: 2020-02-17

## 2020-02-10 RX ORDER — BENZONATATE 200 MG/1
200 CAPSULE ORAL 3 TIMES DAILY PRN
Qty: 20 CAPSULE | Refills: 0 | Status: SHIPPED | OUTPATIENT
Start: 2020-02-10

## 2020-02-10 NOTE — PATIENT INSTRUCTIONS

## 2020-02-18 NOTE — PROGRESS NOTES
[facility]        NAME: Barbie Monet is a 50 y o  female  : 1965    MRN: [id number]  DATE: 2020  TIME: 9:41 PM    Assessment and Plan   Chest congestion [R09 89]  1  Chest congestion  amoxicillin-clavulanate (AUGMENTIN) 875-125 mg per tablet    benzonatate (TESSALON) 200 MG capsule   2  Earache on left           Patient Instructions       Follow up with PCP in 3-5 days  Proceed to  ER if symptoms worsen  Chief Complaint     Chief Complaint   Patient presents with    Cough     cough, ear clogged         History of Present Illness       Cough   This is a new problem  The current episode started in the past 7 days  The problem has been unchanged  The problem occurs constantly  The cough is non-productive  Associated symptoms include ear congestion and ear pain  Nothing aggravates the symptoms  She has tried nothing for the symptoms  The treatment provided no relief  Review of Systems   Review of Systems   Constitutional: Negative  HENT: Positive for congestion and ear pain  Eyes: Negative  Respiratory: Positive for cough  Cardiovascular: Negative  All other systems reviewed and are negative          Current Medications       Current Outpatient Medications:     levothyroxine 25 mcg tablet, Take 25 mcg by mouth daily  , Disp: , Rfl: 5    metoprolol succinate (TOPROL-XL) 25 mg 24 hr tablet, Take 25 mg by mouth daily  , Disp: , Rfl: 2    amoxicillin-clavulanate (AUGMENTIN) 875-125 mg per tablet, Take 1 tablet by mouth every 12 (twelve) hours for 7 days, Disp: 14 tablet, Rfl: 0    benzonatate (TESSALON) 200 MG capsule, Take 1 capsule (200 mg total) by mouth 3 (three) times a day as needed for cough, Disp: 20 capsule, Rfl: 0    traZODone (DESYREL) 50 mg tablet, , Disp: , Rfl:     Current Allergies     Allergies as of 02/10/2020 - Reviewed 02/10/2020   Allergen Reaction Noted    Quinolones Anaphylaxis 2011    Oxycodone-acetaminophen Rash 2018 The following portions of the patient's history were reviewed and updated as appropriate: allergies, current medications, past family history, past medical history, past social history, past surgical history and problem list      Past Medical History:   Diagnosis Date    Hypertension        Past Surgical History:   Procedure Laterality Date    CHOLECYSTECTOMY      FOOT SURGERY      TONSILLECTOMY         Family History   Problem Relation Age of Onset    Lung cancer Mother     Hypertension Maternal Grandmother     Diabetes Maternal Grandmother          Medications have been verified  Objective   /96   Pulse 83   Temp 98 °F (36 7 °C)   Resp 18   Ht 5' 8" (1 727 m)   Wt 116 kg (255 lb)   SpO2 96%   BMI 38 77 kg/m²        Physical Exam     Physical Exam   Constitutional: She appears well-developed  HENT:   Head: Normocephalic and atraumatic  Right Ear: Tympanic membrane is erythematous and bulging  Left Ear: Tympanic membrane is erythematous and bulging  Eyes: Pupils are equal, round, and reactive to light  EOM are normal    Cardiovascular: Normal rate and regular rhythm  Pulmonary/Chest: Effort normal and breath sounds normal    Abdominal: Soft  Nursing note and vitals reviewed

## 2020-09-08 ENCOUNTER — TELEPHONE (OUTPATIENT)
Dept: DERMATOLOGY | Facility: CLINIC | Age: 55
End: 2020-09-08

## 2020-09-08 NOTE — TELEPHONE ENCOUNTER
Return call made to patient, LVM asking her to call back if she would like her son to be added to the wait list as a new patient

## 2021-03-01 DIAGNOSIS — Z23 ENCOUNTER FOR IMMUNIZATION: ICD-10-CM

## 2021-03-04 ENCOUNTER — IMMUNIZATIONS (OUTPATIENT)
Dept: FAMILY MEDICINE CLINIC | Facility: HOSPITAL | Age: 56
End: 2021-03-04

## 2021-03-04 DIAGNOSIS — Z23 ENCOUNTER FOR IMMUNIZATION: Primary | ICD-10-CM

## 2021-03-04 PROCEDURE — 91300 SARS-COV-2 / COVID-19 MRNA VACCINE (PFIZER-BIONTECH) 30 MCG: CPT

## 2021-03-04 PROCEDURE — 0001A SARS-COV-2 / COVID-19 MRNA VACCINE (PFIZER-BIONTECH) 30 MCG: CPT

## 2021-03-26 ENCOUNTER — IMMUNIZATIONS (OUTPATIENT)
Dept: FAMILY MEDICINE CLINIC | Facility: HOSPITAL | Age: 56
End: 2021-03-26

## 2021-03-26 DIAGNOSIS — Z23 ENCOUNTER FOR IMMUNIZATION: Primary | ICD-10-CM

## 2021-03-26 PROCEDURE — 91300 SARS-COV-2 / COVID-19 MRNA VACCINE (PFIZER-BIONTECH) 30 MCG: CPT

## 2021-03-26 PROCEDURE — 0002A SARS-COV-2 / COVID-19 MRNA VACCINE (PFIZER-BIONTECH) 30 MCG: CPT

## 2022-10-02 ENCOUNTER — OFFICE VISIT (OUTPATIENT)
Dept: URGENT CARE | Facility: CLINIC | Age: 57
End: 2022-10-02
Payer: COMMERCIAL

## 2022-10-02 VITALS
DIASTOLIC BLOOD PRESSURE: 110 MMHG | OXYGEN SATURATION: 99 % | TEMPERATURE: 97.9 F | HEIGHT: 68 IN | BODY MASS INDEX: 40.01 KG/M2 | SYSTOLIC BLOOD PRESSURE: 170 MMHG | RESPIRATION RATE: 22 BRPM | HEART RATE: 96 BPM | WEIGHT: 264 LBS

## 2022-10-02 DIAGNOSIS — H66.90 ACUTE OTITIS MEDIA, UNSPECIFIED OTITIS MEDIA TYPE: Primary | ICD-10-CM

## 2022-10-02 DIAGNOSIS — R03.0 ELEVATED BLOOD PRESSURE READING: ICD-10-CM

## 2022-10-02 LAB
SARS-COV-2 AG UPPER RESP QL IA: NEGATIVE
VALID CONTROL: NORMAL

## 2022-10-02 PROCEDURE — 87811 SARS-COV-2 COVID19 W/OPTIC: CPT | Performed by: PHYSICIAN ASSISTANT

## 2022-10-02 PROCEDURE — 99204 OFFICE O/P NEW MOD 45 MIN: CPT | Performed by: PHYSICIAN ASSISTANT

## 2022-10-02 RX ORDER — AMOXICILLIN 875 MG/1
875 TABLET, COATED ORAL 2 TIMES DAILY
Qty: 20 TABLET | Refills: 0 | Status: SHIPPED | OUTPATIENT
Start: 2022-10-02 | End: 2022-10-12

## 2022-10-02 NOTE — PROGRESS NOTES
[facility]        NAME: Marco A Meyer is a 56 y o  female  : 1965    MRN: [id number]  DATE: 2022  TIME: 1:21 PM    Assessment and Plan   Acute otitis media, unspecified otitis media type [H66 90]  1  Acute otitis media, unspecified otitis media type  amoxicillin (AMOXIL) 875 mg tablet    Poct Covid 19 Rapid Antigen Test   2  Elevated blood pressure reading       Will tx for AOM however advised pt to contact pcp about her elevated blood pressure when she gets home  Discussed strict return to care precautions as well as red flag symptoms which should prompt immediate ED referral  Pt verbalized understanding and is in agreement with plan  Please follow up with your primary care provider within the next week  Please remember that your visit today was with an urgent care provider and should not replace follow up with your primary care provider for chronic medical issues or annual physicals  Patient Instructions       Follow up with PCP in 3-5 days  Proceed to  ER if symptoms worsen  Chief Complaint     Chief Complaint   Patient presents with    URI     Pt reports of worsening URI s/s with post nasal drip and ear pain x 1 5 weeks  History of Present Illness       Pt is a 57 yo female with pmh HTN pw L ear pain x 1 day  States she has been experiencing congestion, runny nose x 1 5 weeks and tested negative for covid  Then this morning left ear started feeling clogged, muffled, throbbing pain  BP elevated, states it is usually 120-130/70-80  Denies dizziness, nausea/vomiting, visual changes, neck pain, chest pain  Review of Systems   Review of Systems   Constitutional: Negative for chills, diaphoresis, fatigue and fever  HENT: Positive for congestion, ear pain and rhinorrhea  Negative for postnasal drip, sinus pain, sneezing, sore throat and trouble swallowing  Eyes: Negative for pain and redness     Respiratory: Negative for cough, chest tightness, shortness of breath and wheezing  Cardiovascular: Negative for chest pain and leg swelling  Gastrointestinal: Negative for diarrhea, nausea and vomiting  Musculoskeletal: Negative for myalgias  Neurological: Negative for dizziness, weakness and headaches  Current Medications       Current Outpatient Medications:     amoxicillin (AMOXIL) 875 mg tablet, Take 1 tablet (875 mg total) by mouth 2 (two) times a day for 10 days, Disp: 20 tablet, Rfl: 0    benzonatate (TESSALON) 200 MG capsule, Take 1 capsule (200 mg total) by mouth 3 (three) times a day as needed for cough, Disp: 20 capsule, Rfl: 0    clobetasol (TEMOVATE) 0 05 % cream, APPLY TWICE A DAY FOR 14 DAYS TO AFFECTED AREA (Patient not taking: Reported on 6/1/2022), Disp: , Rfl:     levothyroxine 25 mcg tablet, Take 25 mcg by mouth daily  , Disp: , Rfl: 5    metoprolol succinate (TOPROL-XL) 25 mg 24 hr tablet, Take 25 mg by mouth daily  , Disp: , Rfl: 2    traZODone (DESYREL) 50 mg tablet, , Disp: , Rfl:     Current Allergies     Allergies as of 10/02/2022 - Reviewed 10/02/2022   Allergen Reaction Noted    Levofloxacin Anaphylaxis and Rash 09/07/2020    Quinolones Anaphylaxis 01/24/2011    Oxycodone-acetaminophen Rash 03/06/2018            The following portions of the patient's history were reviewed and updated as appropriate: allergies, current medications, past family history, past medical history, past social history, past surgical history and problem list      Past Medical History:   Diagnosis Date    Ear problems     Hypertension        Past Surgical History:   Procedure Laterality Date    CHOLECYSTECTOMY      FOOT SURGERY      TONSILLECTOMY         Family History   Problem Relation Age of Onset    Lung cancer Mother     Hypertension Maternal Grandmother     Diabetes Maternal Grandmother          Medications have been verified  Objective   BP (!) 170/110 Comment: left sided Manual 180/115 on right side    Pulse 96   Temp 97 9 °F (36 6 °C)   Resp 22   Ht 5' 8" (1 727 m)   Wt 120 kg (264 lb)   SpO2 99%   BMI 40 14 kg/m²        Physical Exam     Physical Exam  Vitals and nursing note reviewed  Constitutional:       General: She is not in acute distress  Appearance: Normal appearance  She is not toxic-appearing  HENT:      Head: Normocephalic and atraumatic  Right Ear: Tympanic membrane, ear canal and external ear normal       Left Ear: Ear canal and external ear normal  A middle ear effusion is present  Tympanic membrane is injected  Nose: No congestion  Mouth/Throat:      Mouth: Mucous membranes are moist       Pharynx: Oropharynx is clear  No oropharyngeal exudate or posterior oropharyngeal erythema  Eyes:      Conjunctiva/sclera: Conjunctivae normal       Pupils: Pupils are equal, round, and reactive to light  Cardiovascular:      Rate and Rhythm: Normal rate and regular rhythm  Heart sounds: Normal heart sounds  Pulmonary:      Effort: Pulmonary effort is normal  No respiratory distress  Breath sounds: Normal breath sounds  No wheezing, rhonchi or rales  Abdominal:      General: Abdomen is flat  Palpations: Abdomen is soft  Musculoskeletal:      Cervical back: Normal range of motion and neck supple  Skin:     General: Skin is warm and dry  Capillary Refill: Capillary refill takes less than 2 seconds  Neurological:      Mental Status: She is alert and oriented to person, place, and time     Psychiatric:         Behavior: Behavior normal

## 2022-10-12 ENCOUNTER — OFFICE VISIT (OUTPATIENT)
Dept: FAMILY MEDICINE CLINIC | Facility: CLINIC | Age: 57
End: 2022-10-12
Payer: COMMERCIAL

## 2022-10-12 VITALS
HEART RATE: 82 BPM | SYSTOLIC BLOOD PRESSURE: 160 MMHG | BODY MASS INDEX: 40.32 KG/M2 | WEIGHT: 266 LBS | RESPIRATION RATE: 18 BRPM | DIASTOLIC BLOOD PRESSURE: 90 MMHG | OXYGEN SATURATION: 99 % | HEIGHT: 68 IN | TEMPERATURE: 98.2 F

## 2022-10-12 DIAGNOSIS — J44.9 CHRONIC OBSTRUCTIVE PULMONARY DISEASE, UNSPECIFIED COPD TYPE (HCC): ICD-10-CM

## 2022-10-12 DIAGNOSIS — G47.33 OSA (OBSTRUCTIVE SLEEP APNEA): ICD-10-CM

## 2022-10-12 DIAGNOSIS — R79.89 ELEVATED LFTS: ICD-10-CM

## 2022-10-12 DIAGNOSIS — E04.2 NONTOXIC MULTINODULAR GOITER: ICD-10-CM

## 2022-10-12 DIAGNOSIS — I10 ESSENTIAL HYPERTENSION: Primary | ICD-10-CM

## 2022-10-12 DIAGNOSIS — R73.01 IMPAIRED FASTING GLUCOSE: ICD-10-CM

## 2022-10-12 PROBLEM — M17.12 PRIMARY LOCALIZED OSTEOARTHRITIS OF LEFT KNEE: Status: ACTIVE | Noted: 2020-09-08

## 2022-10-12 PROBLEM — M17.11 PRIMARY OSTEOARTHRITIS OF RIGHT KNEE: Status: ACTIVE | Noted: 2020-11-30

## 2022-10-12 PROCEDURE — 99214 OFFICE O/P EST MOD 30 MIN: CPT | Performed by: NURSE PRACTITIONER

## 2022-10-12 RX ORDER — METOPROLOL SUCCINATE 50 MG/1
50 TABLET, EXTENDED RELEASE ORAL DAILY
Qty: 30 TABLET | Refills: 3 | Status: SHIPPED | OUTPATIENT
Start: 2022-10-12

## 2022-10-12 NOTE — PROGRESS NOTES
Assessment/Plan:      1  Essential hypertension  Assessment & Plan:  Not controlled on Metoprolol  Will increase to 50mg daily  F/u 1 month for BP check, but will have her email me in 2 weeks with her readings  Orders:  -     CBC and differential; Future  -     Comprehensive metabolic panel; Future  -     Lipid panel; Future  -     Hemoglobin A1C; Future  -     metoprolol succinate (TOPROL-XL) 50 mg 24 hr tablet; Take 1 tablet (50 mg total) by mouth daily  -     CBC and differential  -     Comprehensive metabolic panel  -     Lipid panel  -     Hemoglobin A1C    2  BILLY (obstructive sleep apnea)  Assessment & Plan:  Has not worn CPAP in years, has a difficult time wearing the mask  Has not had follow up for this  Discussed long term health implications of untreated BILLY      3  Nontoxic multinodular goiter  Assessment & Plan:  Takes Levothyroxine 25mcg daily     Orders:  -     TSH, 3rd generation; Future  -     T4, free; Future  -     US thyroid; Future; Expected date: 10/12/2022  -     TSH, 3rd generation  -     T4, free    4  Impaired fasting glucose  Assessment & Plan:  Reviewed dietary modification, will check labs      Orders:  -     CBC and differential; Future  -     Comprehensive metabolic panel; Future  -     Lipid panel; Future  -     Hemoglobin A1C; Future  -     CBC and differential  -     Comprehensive metabolic panel  -     Lipid panel  -     Hemoglobin A1C    5  Elevated LFTs  -     US abdomen limited; Future; Expected date: 10/12/2022    6  Chronic obstructive pulmonary disease, unspecified COPD type ([facility] [address] )  Assessment & Plan:  Pt is unclear why this is on her record  Reports she was never diagnosed with or worked up for COPD in the past   History of social smoking, but did have significant second hand smoke exposure as a child  She defers PFT for now  She is not having any SOB, chronic cough, or breathing difficulties                   Patient Instructions   Email me with your blood pressure readings in 2 weeks  Return in about 4 weeks (around 11/9/2022)  Subjective:      Patient ID: Uzair Pepe is a 56 y o  female  Chief Complaint   Patient presents with   • Establish Care     Needs PCP  pascual   • Blood Pressure Check       Here today to establish care  Her BP has been running very high  Has been on Toprol for several years, diagnosed with HTN during last pregnancy  Had total knee arthroplasty in June, followed by revision in July to infection  No issues since  The following portions of the patient's history were reviewed and updated as appropriate: allergies, current medications, past family history, past medical history, past social history, past surgical history and problem list     Review of Systems   Constitutional: Negative for chills, fatigue and fever  HENT: Positive for ear pain  Respiratory: Negative for cough, shortness of breath and wheezing  Cardiovascular: Negative for chest pain, palpitations and leg swelling  Gastrointestinal: Negative for abdominal pain, diarrhea, nausea and vomiting  Skin: Negative for rash  Neurological: Negative for dizziness and headaches  Current Outpatient Medications   Medication Sig Dispense Refill   • levothyroxine 25 mcg tablet Take 25 mcg by mouth daily    5   • metoprolol succinate (TOPROL-XL) 50 mg 24 hr tablet Take 1 tablet (50 mg total) by mouth daily 30 tablet 3     No current facility-administered medications for this visit  Objective:    /90   Pulse 82   Temp 98 2 °F (36 8 °C)   Resp 18   Ht 5' 8" (1 727 m)   Wt 121 kg (266 lb)   SpO2 99%   BMI 40 45 kg/m²        Physical Exam  Vitals and nursing note reviewed  Constitutional:       Appearance: Normal appearance  She is well-developed  HENT:      Head: Normocephalic and atraumatic        Right Ear: Tympanic membrane, ear canal and external ear normal       Left Ear: Tympanic membrane, ear canal and external ear normal  Nose: No mucosal edema or rhinorrhea  Mouth/Throat:      Pharynx: Uvula midline  Eyes:      Conjunctiva/sclera: Conjunctivae normal    Neck:      Thyroid: No thyromegaly  Cardiovascular:      Rate and Rhythm: Normal rate and regular rhythm  Pulses: Normal pulses  Heart sounds: No murmur heard  Pulmonary:      Effort: Pulmonary effort is normal       Breath sounds: Normal breath sounds  Abdominal:      General: Bowel sounds are normal  There is no distension  Palpations: There is no hepatomegaly or splenomegaly  Tenderness: There is no abdominal tenderness  Musculoskeletal:      Cervical back: Neck supple  No edema  Lymphadenopathy:      Cervical:      Right cervical: No superficial cervical adenopathy  Left cervical: No superficial cervical adenopathy  Skin:     General: Skin is warm and dry  Findings: No rash  Neurological:      Mental Status: She is alert     Psychiatric:         Mood and Affect: Mood normal          Behavior: Behavior normal                 Bell Grayson

## 2022-10-26 ENCOUNTER — TELEPHONE (OUTPATIENT)
Dept: FAMILY MEDICINE CLINIC | Facility: CLINIC | Age: 57
End: 2022-10-26

## 2022-10-26 DIAGNOSIS — I10 ESSENTIAL HYPERTENSION: Primary | ICD-10-CM

## 2022-10-26 RX ORDER — VALSARTAN 80 MG/1
80 TABLET ORAL DAILY
Qty: 30 TABLET | Refills: 3 | Status: SHIPPED | OUTPATIENT
Start: 2022-10-26

## 2022-10-26 NOTE — TELEPHONE ENCOUNTER
----- Message from [facility] sent at 10/25/2022  3:19 PM EDT -----  Regarding: FW: bp    ----- Message -----  From: Kayli Bauer  Sent: 10/25/2022   1:07 PM EDT  To: [facility]  Subject: bp                                               Hi Bell    As requested I am sending you a few weeks worth of bp readings  As you can see they are still pretty elevated  I have my blood work on oct 29 and both ultrasounds on Nov 10  Please advise     Thank you   Asim Anand

## 2022-10-26 NOTE — TELEPHONE ENCOUNTER
Spoke w/ pt regarding BP readings, still significantly elevated  No symptoms  Will add Valsartan in addition to Metoprolol  She will schedule office f/u to review her test results and BP check after she has them done next month   Ingrid Drake

## 2022-10-30 LAB
ALBUMIN SERPL-MCNC: 4.4 G/DL (ref 3.8–4.9)
ALBUMIN/GLOB SERPL: 1.6 {RATIO} (ref 1.2–2.2)
ALP SERPL-CCNC: 81 IU/L (ref 44–121)
ALT SERPL-CCNC: 81 IU/L (ref 0–32)
AST SERPL-CCNC: 59 IU/L (ref 0–40)
BASOPHILS # BLD AUTO: 0.1 X10E3/UL (ref 0–0.2)
BASOPHILS NFR BLD AUTO: 1 %
BILIRUB SERPL-MCNC: 0.4 MG/DL (ref 0–1.2)
BUN SERPL-MCNC: 9 MG/DL (ref 6–24)
BUN/CREAT SERPL: 14 (ref 9–23)
CALCIUM SERPL-MCNC: 9.7 MG/DL (ref 8.7–10.2)
CHLORIDE SERPL-SCNC: 103 MMOL/L (ref 96–106)
CHOLEST SERPL-MCNC: 232 MG/DL (ref 100–199)
CHOLEST/HDLC SERPL: 4 RATIO (ref 0–4.4)
CO2 SERPL-SCNC: 23 MMOL/L (ref 20–29)
CREAT SERPL-MCNC: 0.66 MG/DL (ref 0.57–1)
EGFR: 104 ML/MIN/1.73
EOSINOPHIL # BLD AUTO: 0.4 X10E3/UL (ref 0–0.4)
EOSINOPHIL NFR BLD AUTO: 5 %
ERYTHROCYTE [DISTWIDTH] IN BLOOD BY AUTOMATED COUNT: 15 % (ref 11.7–15.4)
EST. AVERAGE GLUCOSE BLD GHB EST-MCNC: 126 MG/DL
GLOBULIN SER-MCNC: 2.7 G/DL (ref 1.5–4.5)
GLUCOSE SERPL-MCNC: 115 MG/DL (ref 70–99)
HBA1C MFR BLD: 6 % (ref 4.8–5.6)
HCT VFR BLD AUTO: 46.7 % (ref 34–46.6)
HDLC SERPL-MCNC: 58 MG/DL
HGB BLD-MCNC: 15.2 G/DL (ref 11.1–15.9)
IMM GRANULOCYTES # BLD: 0 X10E3/UL (ref 0–0.1)
IMM GRANULOCYTES NFR BLD: 0 %
LDLC SERPL CALC-MCNC: 150 MG/DL (ref 0–99)
LYMPHOCYTES # BLD AUTO: 2.1 X10E3/UL (ref 0.7–3.1)
LYMPHOCYTES NFR BLD AUTO: 29 %
MCH RBC QN AUTO: 27.1 PG (ref 26.6–33)
MCHC RBC AUTO-ENTMCNC: 32.5 G/DL (ref 31.5–35.7)
MCV RBC AUTO: 83 FL (ref 79–97)
MICRODELETION SYND BLD/T FISH: NORMAL
MONOCYTES # BLD AUTO: 0.6 X10E3/UL (ref 0.1–0.9)
MONOCYTES NFR BLD AUTO: 8 %
NEUTROPHILS # BLD AUTO: 4.1 X10E3/UL (ref 1.4–7)
NEUTROPHILS NFR BLD AUTO: 57 %
PLATELET # BLD AUTO: 330 X10E3/UL (ref 150–450)
POTASSIUM SERPL-SCNC: 4.5 MMOL/L (ref 3.5–5.2)
PROT SERPL-MCNC: 7.1 G/DL (ref 6–8.5)
RBC # BLD AUTO: 5.61 X10E6/UL (ref 3.77–5.28)
SL AMB VLDL CHOLESTEROL CALC: 24 MG/DL (ref 5–40)
SODIUM SERPL-SCNC: 142 MMOL/L (ref 134–144)
T4 FREE SERPL-MCNC: 0.97 NG/DL (ref 0.82–1.77)
TRIGL SERPL-MCNC: 135 MG/DL (ref 0–149)
TSH SERPL DL<=0.005 MIU/L-ACNC: 1.26 UIU/ML (ref 0.45–4.5)
WBC # BLD AUTO: 7.1 X10E3/UL (ref 3.4–10.8)

## 2022-11-10 ENCOUNTER — HOSPITAL ENCOUNTER (OUTPATIENT)
Dept: RADIOLOGY | Facility: HOSPITAL | Age: 57
Discharge: HOME | End: 2022-11-10

## 2022-11-10 DIAGNOSIS — R79.89 ELEVATED LFTS: ICD-10-CM

## 2022-11-10 DIAGNOSIS — E04.2 NONTOXIC MULTINODULAR GOITER: ICD-10-CM

## 2022-11-14 ENCOUNTER — OFFICE VISIT (OUTPATIENT)
Dept: FAMILY MEDICINE CLINIC | Facility: CLINIC | Age: 57
End: 2022-11-14

## 2022-11-14 VITALS
RESPIRATION RATE: 16 BRPM | SYSTOLIC BLOOD PRESSURE: 172 MMHG | DIASTOLIC BLOOD PRESSURE: 98 MMHG | HEIGHT: 68 IN | TEMPERATURE: 97.4 F | BODY MASS INDEX: 41.07 KG/M2 | HEART RATE: 89 BPM | OXYGEN SATURATION: 98 % | WEIGHT: 271 LBS

## 2022-11-14 DIAGNOSIS — I10 ESSENTIAL HYPERTENSION: Primary | ICD-10-CM

## 2022-11-14 DIAGNOSIS — R73.01 IMPAIRED FASTING GLUCOSE: ICD-10-CM

## 2022-11-14 DIAGNOSIS — G47.33 OSA (OBSTRUCTIVE SLEEP APNEA): ICD-10-CM

## 2022-11-14 DIAGNOSIS — E66.01 MORBID OBESITY (HCC): ICD-10-CM

## 2022-11-14 PROBLEM — D35.2 PITUITARY ADENOMA (HCC): Status: ACTIVE | Noted: 2022-11-14

## 2022-11-14 RX ORDER — PEN NEEDLE, DIABETIC 30 GX3/16"
NEEDLE, DISPOSABLE MISCELLANEOUS DAILY
Qty: 30 EACH | Refills: 3 | Status: SHIPPED | OUTPATIENT
Start: 2022-11-14

## 2022-11-14 RX ORDER — CLOBETASOL PROPIONATE 0.5 MG/G
CREAM TOPICAL
COMMUNITY
Start: 2022-10-17

## 2022-11-14 RX ORDER — VALSARTAN 80 MG/1
80 TABLET ORAL DAILY
Qty: 30 TABLET | Refills: 3 | Status: SHIPPED | OUTPATIENT
Start: 2022-11-14 | End: 2022-11-14 | Stop reason: SDUPTHER

## 2022-11-14 RX ORDER — VALSARTAN 320 MG/1
320 TABLET ORAL DAILY
Qty: 30 TABLET | Refills: 3 | Status: SHIPPED | OUTPATIENT
Start: 2022-11-14

## 2022-11-14 NOTE — ASSESSMENT & PLAN NOTE
Has difficulty exercising d/t knee issues  Interested in starting medication to help her lose weight  Will sent rx for Saxenda    To f/u in 6 weeks

## 2022-11-14 NOTE — PROGRESS NOTES
Assessment/Plan:    1  Essential hypertension  Assessment & Plan:  Not controlled  Will maximize Valsartan and cont Metoprolol    Orders:  -     valsartan (DIOVAN) 320 MG tablet; Take 1 tablet (320 mg total) by mouth daily    2  Impaired fasting glucose  Assessment & Plan:  Reviewed labs and importance of low carb diet and exercise       3  BILLY (obstructive sleep apnea)  Assessment & Plan: Will discuss additional workup at f/u visit        4  Morbid obesity ([address] )  Assessment & Plan:  Has difficulty exercising d/t knee issues  Interested in starting medication to help her lose weight  Will sent rx for Saxenda  To f/u in 6 weeks     Orders:  -     liraglutide (SAXENDA) injection; Inject 0 1 mL (0 6 mg total) under the skin daily for 7 days, THEN 0 2 mL (1 2 mg total) daily for 7 days, THEN 0 3 mL (1 8 mg total) daily for 7 days, THEN 0 4 mL (2 4 mg total) daily for 7 days  -     Insulin Pen Needle (Pen Needles) 32G X 4 MM MISC; Use in the morning      BMI Counseling: Body mass index is 41 21 kg/m²  The BMI is above normal  Nutrition recommendations include consuming healthier snacks  Exercise recommendations include moderate physical activity 150 minutes/week  Rationale for BMI follow-up plan is due to patient being overweight or obese  There are no Patient Instructions on file for this visit  Return in about 6 weeks (around 12/26/2022)  Subjective:      Patient ID: Stacey Schwab is a 56 y o  female  Chief Complaint   Patient presents with   • Follow-up     Bp check Mauri Ortiz         Following up on BP and labs  Has been monitoring her BP consistently and readings are still in 170s, confirmed by school nurse     No symptoms, feels well physically  Labs done, as well as thyroid and abdominal US        The following portions of the patient's history were reviewed and updated as appropriate: allergies, current medications, past family history, past medical history, past social history, past surgical history and problem list     Review of Systems   Constitutional: Negative for chills, fatigue and fever  Respiratory: Negative for cough, shortness of breath and wheezing  Cardiovascular: Negative for chest pain, palpitations and leg swelling  Gastrointestinal: Negative for abdominal pain, diarrhea, nausea and vomiting  Skin: Negative for rash  Neurological: Negative for dizziness and headaches  Current Outpatient Medications   Medication Sig Dispense Refill   • Insulin Pen Needle (Pen Needles) 32G X 4 MM MISC Use in the morning 30 each 3   • levothyroxine 25 mcg tablet Take 25 mcg by mouth daily    5   • liraglutide (SAXENDA) injection Inject 0 1 mL (0 6 mg total) under the skin daily for 7 days, THEN 0 2 mL (1 2 mg total) daily for 7 days, THEN 0 3 mL (1 8 mg total) daily for 7 days, THEN 0 4 mL (2 4 mg total) daily for 7 days  7 mL 0   • metoprolol succinate (TOPROL-XL) 50 mg 24 hr tablet Take 1 tablet (50 mg total) by mouth daily 30 tablet 3   • valsartan (DIOVAN) 320 MG tablet Take 1 tablet (320 mg total) by mouth daily 30 tablet 3   • clobetasol (TEMOVATE) 0 05 % cream APPLY TWICE A DAY FOR 14 DAYS TO AFFECTED AREA       No current facility-administered medications for this visit  Objective:    BP (!) 172/98   Pulse 89   Temp (!) 97 4 °F (36 3 °C)   Resp 16   Ht 5' 8" (1 727 m)   Wt 123 kg (271 lb)   SpO2 98%   BMI 41 21 kg/m²        Physical Exam  Vitals and nursing note reviewed  Constitutional:       Appearance: Normal appearance  She is well-developed  She is obese  Neck:      Vascular: No carotid bruit  Cardiovascular:      Rate and Rhythm: Normal rate and regular rhythm  Pulses: Normal pulses  Heart sounds: Normal heart sounds  No murmur heard  Pulmonary:      Effort: Pulmonary effort is normal       Breath sounds: Normal breath sounds  Abdominal:      General: Bowel sounds are normal    Skin:     General: Skin is warm and dry  Capillary Refill: Capillary refill takes less than 2 seconds  Neurological:      Mental Status: She is alert     Psychiatric:         Mood and Affect: Mood normal          Behavior: Behavior normal                 KAREEM Cary

## 2022-11-17 ENCOUNTER — TELEPHONE (OUTPATIENT)
Dept: FAMILY MEDICINE CLINIC | Facility: CLINIC | Age: 57
End: 2022-11-17

## 2022-11-17 NOTE — TELEPHONE ENCOUNTER
I called [facility] regarding Kacey's message regarding Brendana Jonnyen being approved  It did not go through and needs a prior [place]  They are faxing us the information   She was not able to provide a phone number but states to also use Cover my meds [id number]

## 2022-11-17 NOTE — TELEPHONE ENCOUNTER
Phentermine was approved earlier but not successful on this medication So  Cover My Meds for Merline Sanes [id number]  ID [id number]  Forms completed  Waiting for a decision or more questions  pascual

## 2022-11-18 ENCOUNTER — TELEPHONE (OUTPATIENT)
Dept: FAMILY MEDICINE CLINIC | Facility: CLINIC | Age: 57
End: 2022-11-18

## 2022-12-15 ENCOUNTER — RA CDI HCC (OUTPATIENT)
Dept: OTHER | Facility: HOSPITAL | Age: 57
End: 2022-12-15

## 2022-12-15 NOTE — PROGRESS NOTES
[facility]  coding opportunities       Chart reviewed, no opportunity found: CHART REVIEWED, NO OPPORTUNITY FOUND        Patients Insurance        Commercial Insurance: Janet Crabtree

## 2023-02-06 DIAGNOSIS — I10 ESSENTIAL HYPERTENSION: ICD-10-CM

## 2023-02-06 RX ORDER — METOPROLOL SUCCINATE 50 MG/1
TABLET, EXTENDED RELEASE ORAL
Qty: 30 TABLET | Refills: 3 | Status: SHIPPED | OUTPATIENT
Start: 2023-02-06

## 2023-03-09 DIAGNOSIS — I10 ESSENTIAL HYPERTENSION: ICD-10-CM

## 2023-03-09 RX ORDER — VALSARTAN 320 MG/1
TABLET ORAL
Qty: 30 TABLET | Refills: 3 | Status: SHIPPED | OUTPATIENT
Start: 2023-03-09

## 2023-03-17 NOTE — ASSESSMENT & PLAN NOTE
Has not worn CPAP in years, has a difficult time wearing the mask  Has not had follow up for this    Discussed long term health implications of untreated BILLY
Not controlled on Metoprolol  Will increase to 50mg daily  F/u 1 month for BP check, but will have her email me in 2 weeks with her readings 
Pt is unclear why this is on her record  Reports she was never diagnosed with or worked up for COPD in the past   History of social smoking, but did have significant second hand smoke exposure as a child  She defers PFT for now  She is not having any SOB, chronic cough, or breathing difficulties 
Reviewed dietary modification, will check labs
Takes Levothyroxine 25mcg daily
Mike Sheriff

## 2023-04-19 ENCOUNTER — TELEPHONE (OUTPATIENT)
Dept: FAMILY MEDICINE CLINIC | Facility: CLINIC | Age: 58
End: 2023-04-19

## 2023-04-19 NOTE — TELEPHONE ENCOUNTER
Submitted prior auth for Saxenda  Waiting for a response      Key: [id number]  Rx#: [id number]    Gricelda Fraser LPN

## 2023-04-20 NOTE — TELEPHONE ENCOUNTER
"Prior Neal Bleak was denied  On denial letter it states \"must have had and kept a weight loss of at least 4% since starting the requested drug\"    Star Orr LPN    "

## 2023-04-21 NOTE — TELEPHONE ENCOUNTER
Spoke to the patient and informed her  I scheduled her for an appointment, and then we can fax an appeal with the office note to see if Cheng Course will get approved  KASIA Mosher LPN

## 2023-04-21 NOTE — TELEPHONE ENCOUNTER
Please let pt know  She has not come in for a follow up since the medication was started   Baljit Luna

## 2023-05-04 ENCOUNTER — OFFICE VISIT (OUTPATIENT)
Dept: FAMILY MEDICINE CLINIC | Facility: CLINIC | Age: 58
End: 2023-05-04

## 2023-05-04 VITALS
HEIGHT: 68 IN | RESPIRATION RATE: 16 BRPM | WEIGHT: 262.6 LBS | BODY MASS INDEX: 39.8 KG/M2 | HEART RATE: 80 BPM | SYSTOLIC BLOOD PRESSURE: 140 MMHG | OXYGEN SATURATION: 98 % | TEMPERATURE: 96.1 F | DIASTOLIC BLOOD PRESSURE: 90 MMHG

## 2023-05-04 DIAGNOSIS — D35.2 PITUITARY ADENOMA (HCC): ICD-10-CM

## 2023-05-04 DIAGNOSIS — R73.01 IMPAIRED FASTING GLUCOSE: ICD-10-CM

## 2023-05-04 DIAGNOSIS — I10 ESSENTIAL HYPERTENSION: Primary | ICD-10-CM

## 2023-05-04 DIAGNOSIS — E66.01 MORBID OBESITY (HCC): ICD-10-CM

## 2023-05-04 RX ORDER — VALSARTAN AND HYDROCHLOROTHIAZIDE 320; 25 MG/1; MG/1
1 TABLET, FILM COATED ORAL DAILY
Qty: 30 TABLET | Refills: 5 | Status: SHIPPED | OUTPATIENT
Start: 2023-05-04

## 2023-05-04 RX ORDER — LIRAGLUTIDE 6 MG/ML
3 INJECTION, SOLUTION SUBCUTANEOUS DAILY
Qty: 15 ML | Refills: 3 | Status: SHIPPED | OUTPATIENT
Start: 2023-05-04

## 2023-05-04 RX ORDER — CLINDAMYCIN HYDROCHLORIDE 300 MG/1
CAPSULE ORAL
COMMUNITY
Start: 2023-03-23

## 2023-05-04 NOTE — PROGRESS NOTES
Assessment/Plan:    She has lost over 10 pounds since starting Saxenda  Will continue at current dosage  Discussed continuing to work on dietary changes and exercise  1  Essential hypertension  Assessment & Plan:  Not controlled  She will bring her cuff in to next visit to confirm accuracy  Will add HCTZ  25mg  Order given for repeat labs prior to next visit    Orders:  -     valsartan-hydrochlorothiazide (DIOVAN-HCT) 320-25 MG per tablet; Take 1 tablet by mouth daily  -     Basic metabolic panel; Future  -     Hemoglobin A1C; Future  -     Lipid panel; Future  -     Basic metabolic panel  -     Hemoglobin A1C  -     Lipid panel    2  Morbid obesity ([facility] )  Assessment & Plan: Will continue with Saxenda     Orders:  -     liraglutide (Saxenda) injection; Inject 0 5 mL (3 mg total) under the skin daily  -     Basic metabolic panel; Future  -     Hemoglobin A1C; Future  -     Lipid panel; Future  -     Basic metabolic panel  -     Hemoglobin A1C  -     Lipid panel    3  Pituitary adenoma ([place] [address] )    4  Impaired fasting glucose  Assessment & Plan:  Labs reviewed, continue lifestyle modifications        BMI Counseling: Body mass index is 39 93 kg/m²  The BMI is above normal  Nutrition recommendations include consuming healthier snacks  Exercise recommendations include moderate physical activity 150 minutes/week  Rationale for BMI follow-up plan is due to patient being overweight or obese  Depression Screening and Follow-up Plan: Patient was screened for depression during today's encounter  They screened negative with a PHQ-2 score of 0  There are no Patient Instructions on file for this visit  Return in about 3 months (around 8/4/2023)  Subjective:      Patient ID: Judy Stern is a 66 y o  female  Chief Complaint   Patient presents with    Follow-up    Blood Pressure Check    Weight Loss     SADAF Hubbard/OSORIO       Here today for follow up    Has been taking Saxenda since November, tolerating "well at 3mg daily  She is still working on dietary changes  She has been getting headaches  Has been checking her BP at home and it is running high  The following portions of the patient's history were reviewed and updated as appropriate: allergies, current medications, past family history, past medical history, past social history, past surgical history and problem list     Review of Systems   Constitutional: Negative for chills, fatigue and fever  Respiratory: Negative for cough, shortness of breath and wheezing  Cardiovascular: Negative for chest pain, palpitations and leg swelling  Gastrointestinal: Negative for abdominal pain, diarrhea, nausea and vomiting  Skin: Negative for rash  Neurological: Positive for headaches  Negative for dizziness  Current Outpatient Medications   Medication Sig Dispense Refill    clindamycin (CLEOCIN) 300 MG capsule take 2 capsules by mouth 1 hour prior to appointment      clobetasol (TEMOVATE) 0 05 % cream APPLY TWICE A DAY FOR 14 DAYS TO AFFECTED AREA      Insulin Pen Needle (Pen Needles) 32G X 4 MM MISC Use in the morning 30 each 3    levothyroxine 25 mcg tablet Take 25 mcg by mouth daily    5    liraglutide (Saxenda) injection Inject 0 5 mL (3 mg total) under the skin daily 15 mL 3    metoprolol succinate (TOPROL-XL) 50 mg 24 hr tablet take 1 tablet by mouth once daily 30 tablet 3    valsartan-hydrochlorothiazide (DIOVAN-HCT) 320-25 MG per tablet Take 1 tablet by mouth daily 30 tablet 5     No current facility-administered medications for this visit  Objective:    /90   Pulse 80   Temp (!) 96 1 °F (35 6 °C) (Tympanic)   Resp 16   Ht 5' 8\" (1 727 m)   Wt 119 kg (262 lb 9 6 oz)   SpO2 98%   BMI 39 93 kg/m²        Physical Exam  Vitals and nursing note reviewed  Constitutional:       Appearance: Normal appearance  She is well-developed  Cardiovascular:      Rate and Rhythm: Normal rate and regular rhythm        Heart " sounds: Normal heart sounds  No murmur heard  Pulmonary:      Effort: Pulmonary effort is normal       Breath sounds: Normal breath sounds  Skin:     General: Skin is warm and dry  Neurological:      Mental Status: She is alert     Psychiatric:         Mood and Affect: Mood normal          Behavior: Behavior normal                 KAREEM Cabrera

## 2023-05-04 NOTE — ASSESSMENT & PLAN NOTE
Not controlled  She will bring her cuff in to next visit to confirm accuracy  Will add HCTZ  25mg    Order given for repeat labs prior to next visit

## 2023-05-05 ENCOUNTER — TELEPHONE (OUTPATIENT)
Dept: FAMILY MEDICINE CLINIC | Facility: CLINIC | Age: 58
End: 2023-05-05

## 2023-05-05 NOTE — TELEPHONE ENCOUNTER
Submitted a prior auth for Saxenda, waiting for a response      Key: [id number]  Rx#: [id number]    Star Orr LPN

## 2023-05-09 NOTE — TELEPHONE ENCOUNTER
The reason for the denial is that there is no documented weight loss of at least 4% since she started the medication, which is inaccurate  Per my last note, she has lost over 10 pounds, which is over 4%   Lenor

## 2023-05-10 NOTE — TELEPHONE ENCOUNTER
Patient called 4:11 pm and left a message  inquiring about the status of authorization    Please call her back, [phone]

## 2023-05-11 ENCOUNTER — TELEPHONE (OUTPATIENT)
Dept: ADMINISTRATIVE | Facility: OTHER | Age: 58
End: 2023-05-11

## 2023-05-11 NOTE — TELEPHONE ENCOUNTER
Patient informed that medication was approved per letter under media    Myriam Reyes  No further action at this time

## 2023-05-11 NOTE — TELEPHONE ENCOUNTER
PF [place] q2-3 h ou. Upon review of the In Basket request we were able to locate, review, and update the patient chart as requested for CRC: Colonoscopy  Any additional questions or concerns should be emailed to the Practice Liaisons via the appropriate education email address, please do not reply via In Basket      Thank you  Priyanka Loomis MA

## 2023-05-11 NOTE — TELEPHONE ENCOUNTER
----- Message from Paulina Yanes LPN sent at 9/01/3123  1:10 PM EDT -----  Regarding: Care Gap Requst  05/10/23 1:10 PM    Hello, our patient attached above has had CRC: Colonoscopy and Pap Smear (HPV) aka Cervical Cancer Screening completed/performed  Please assist in updating the patient chart by making an External outreach to Dr Savanna Jackson for pap smear facility located in [facility] for the colonoscopy       Thank you,  Paulina Yanes  [facility]

## 2023-06-12 DIAGNOSIS — I10 ESSENTIAL HYPERTENSION: ICD-10-CM

## 2023-06-12 RX ORDER — METOPROLOL SUCCINATE 50 MG/1
TABLET, EXTENDED RELEASE ORAL
Qty: 30 TABLET | Refills: 3 | Status: SHIPPED | OUTPATIENT
Start: 2023-06-12

## 2023-06-28 ENCOUNTER — TELEPHONE (OUTPATIENT)
Dept: GASTROENTEROLOGY | Facility: CLINIC | Age: 58
End: 2023-06-28

## 2023-07-21 ENCOUNTER — TELEPHONE (OUTPATIENT)
Age: 58
End: 2023-07-21

## 2023-07-21 ENCOUNTER — PREP FOR PROCEDURE (OUTPATIENT)
Age: 58
End: 2023-07-21

## 2023-07-21 DIAGNOSIS — Z86.010 HISTORY OF COLON POLYPS: Primary | ICD-10-CM

## 2023-07-21 DIAGNOSIS — Z86.010 HX OF COLONIC POLYPS: Primary | ICD-10-CM

## 2023-07-21 NOTE — TELEPHONE ENCOUNTER
07/21/23  Screened by: Mala Ortega    Referring Provider Dr. Aguilera Re: There is no height or weight on file to calculate BMI. Has patient been referred for a routine screening Colonoscopy? yes  Is the patient between 43-73 years old? yes      Previous Colonoscopy yes   If yes:    Date: 7/13/2018    Facility:     Reason:       SCHEDULING STAFF: If the patient is between 45yrs-49yrs, please advise patient to confirm benefits/coverage with their insurance company for a routine screening colonoscopy, some insurance carriers will only cover at [address] [place] [address] or older. If the patient is over 66years old, please schedule an office visit. Does the patient want to see a Gastroenterologist prior to their procedure OR are they having any GI symptoms? no    Has the patient been hospitalized or had abdominal surgery in the past 6 months? no    Does the patient use supplemental oxygen? no    Does the patient take Coumadin, Lovenox, Plavix, Elliquis, Xarelto, or other blood thinning medication? no    Has the patient had a stroke, cardiac event, or stent placed in the past year? no    SCHEDULING STAFF: If patient answers NO to above questions, then schedule procedure. If patient answers YES to above questions, then schedule office appointment. PT PASSED OA    If patient is between 45yrs - 49yrs, please advise patient that we will have to confirm benefits & coverage with their insurance company for a routine screening colonoscopy.

## 2023-07-21 NOTE — TELEPHONE ENCOUNTER
Scheduled date of colonoscopy (as of today): 8/14/23  Physician performing colonoscopy: Dr. Sherri Collins  Location of colonoscopy: [facility]  Bowel prep reviewed with patient: Golytely  Instructions reviewed with patient by: Tristen Ellis.   Clearances: N/A

## 2023-08-09 LAB
BUN SERPL-MCNC: 16 MG/DL (ref 6–24)
BUN/CREAT SERPL: 22 (ref 9–23)
CALCIUM SERPL-MCNC: 10.1 MG/DL (ref 8.7–10.2)
CHLORIDE SERPL-SCNC: 101 MMOL/L (ref 96–106)
CHOLEST SERPL-MCNC: 224 MG/DL (ref 100–199)
CHOLEST/HDLC SERPL: 3.2 RATIO (ref 0–4.4)
CO2 SERPL-SCNC: 25 MMOL/L (ref 20–29)
CREAT SERPL-MCNC: 0.73 MG/DL (ref 0.57–1)
EGFR: 96 ML/MIN/1.73
EST. AVERAGE GLUCOSE BLD GHB EST-MCNC: 114 MG/DL
GLUCOSE SERPL-MCNC: 107 MG/DL (ref 70–99)
HBA1C MFR BLD: 5.6 % (ref 4.8–5.6)
HDLC SERPL-MCNC: 71 MG/DL
LDLC SERPL CALC-MCNC: 125 MG/DL (ref 0–99)
MICRODELETION SYND BLD/T FISH: NORMAL
POTASSIUM SERPL-SCNC: 4.2 MMOL/L (ref 3.5–5.2)
SL AMB VLDL CHOLESTEROL CALC: 28 MG/DL (ref 5–40)
SODIUM SERPL-SCNC: 140 MMOL/L (ref 134–144)
TRIGL SERPL-MCNC: 161 MG/DL (ref 0–149)

## 2023-08-10 ENCOUNTER — RA CDI HCC (OUTPATIENT)
Dept: OTHER | Facility: HOSPITAL | Age: 58
End: 2023-08-10

## 2023-08-10 NOTE — PROGRESS NOTES
[facility] coding opportunities       Chart reviewed, no opportunity found: CHART REVIEWED, NO OPPORTUNITY FOUND        Patients Insurance        Commercial Insurance: [address]

## 2023-08-14 ENCOUNTER — HOSPITAL ENCOUNTER (OUTPATIENT)
Dept: GASTROENTEROLOGY | Facility: AMBULARY SURGERY CENTER | Age: 58
Setting detail: OUTPATIENT SURGERY
Discharge: HOME | End: 2023-08-14
Attending: INTERNAL MEDICINE
Payer: COMMERCIAL

## 2023-08-14 ENCOUNTER — ANESTHESIA (OUTPATIENT)
Dept: GASTROENTEROLOGY | Facility: AMBULARY SURGERY CENTER | Age: 58
End: 2023-08-14

## 2023-08-14 ENCOUNTER — ANESTHESIA EVENT (OUTPATIENT)
Dept: GASTROENTEROLOGY | Facility: AMBULARY SURGERY CENTER | Age: 58
End: 2023-08-14

## 2023-08-14 VITALS
RESPIRATION RATE: 18 BRPM | HEART RATE: 78 BPM | TEMPERATURE: 97.7 F | OXYGEN SATURATION: 100 % | SYSTOLIC BLOOD PRESSURE: 127 MMHG | DIASTOLIC BLOOD PRESSURE: 90 MMHG

## 2023-08-14 DIAGNOSIS — Z86.010 HISTORY OF COLON POLYPS: ICD-10-CM

## 2023-08-14 PROCEDURE — 45380 COLONOSCOPY AND BIOPSY: CPT | Performed by: INTERNAL MEDICINE

## 2023-08-14 PROCEDURE — 88305 TISSUE EXAM BY PATHOLOGIST: CPT | Performed by: PATHOLOGY

## 2023-08-14 PROCEDURE — 45385 COLONOSCOPY W/LESION REMOVAL: CPT | Performed by: INTERNAL MEDICINE

## 2023-08-14 RX ORDER — PROPOFOL 10 MG/ML
INJECTION, EMULSION INTRAVENOUS AS NEEDED
Status: DISCONTINUED | OUTPATIENT
Start: 2023-08-14 | End: 2023-08-14

## 2023-08-14 RX ORDER — SODIUM CHLORIDE, SODIUM LACTATE, POTASSIUM CHLORIDE, CALCIUM CHLORIDE 600; 310; 30; 20 MG/100ML; MG/100ML; MG/100ML; MG/100ML
125 INJECTION, SOLUTION INTRAVENOUS CONTINUOUS
Status: DISCONTINUED | OUTPATIENT
Start: 2023-08-14 | End: 2023-08-18 | Stop reason: HOSPADM

## 2023-08-14 RX ORDER — SODIUM CHLORIDE, SODIUM LACTATE, POTASSIUM CHLORIDE, CALCIUM CHLORIDE 600; 310; 30; 20 MG/100ML; MG/100ML; MG/100ML; MG/100ML
INJECTION, SOLUTION INTRAVENOUS CONTINUOUS PRN
Status: DISCONTINUED | OUTPATIENT
Start: 2023-08-14 | End: 2023-08-14

## 2023-08-14 RX ORDER — LIDOCAINE HYDROCHLORIDE 10 MG/ML
INJECTION, SOLUTION EPIDURAL; INFILTRATION; INTRACAUDAL; PERINEURAL AS NEEDED
Status: DISCONTINUED | OUTPATIENT
Start: 2023-08-14 | End: 2023-08-14

## 2023-08-14 RX ORDER — PROPOFOL 10 MG/ML
INJECTION, EMULSION INTRAVENOUS CONTINUOUS PRN
Status: DISCONTINUED | OUTPATIENT
Start: 2023-08-14 | End: 2023-08-14

## 2023-08-14 RX ADMIN — PROPOFOL 100 MG: 10 INJECTION, EMULSION INTRAVENOUS at 10:17

## 2023-08-14 RX ADMIN — SODIUM CHLORIDE, SODIUM LACTATE, POTASSIUM CHLORIDE, AND CALCIUM CHLORIDE 125 ML/HR: .6; .31; .03; .02 INJECTION, SOLUTION INTRAVENOUS at 10:06

## 2023-08-14 RX ADMIN — PROPOFOL 100 MG: 10 INJECTION, EMULSION INTRAVENOUS at 10:16

## 2023-08-14 RX ADMIN — PROPOFOL 100 MG: 10 INJECTION, EMULSION INTRAVENOUS at 10:15

## 2023-08-14 RX ADMIN — PROPOFOL 150 MCG/KG/MIN: 10 INJECTION, EMULSION INTRAVENOUS at 10:14

## 2023-08-14 RX ADMIN — LIDOCAINE HYDROCHLORIDE 50 MG: 10 INJECTION, SOLUTION EPIDURAL; INFILTRATION; INTRACAUDAL; PERINEURAL at 10:15

## 2023-08-14 RX ADMIN — SODIUM CHLORIDE, SODIUM LACTATE, POTASSIUM CHLORIDE, AND CALCIUM CHLORIDE: .6; .31; .03; .02 INJECTION, SOLUTION INTRAVENOUS at 10:08

## 2023-08-14 NOTE — ANESTHESIA POSTPROCEDURE EVALUATION
Post-Op Assessment Note    CV Status:  Stable  Pain Score: 0    Pain management: adequate     Mental Status:  Sleepy   Hydration Status:  Stable   PONV Controlled:  None   Airway Patency:  Patent   Two or more mitigation strategies used for obstructive sleep apnea   Post Op Vitals Reviewed: Yes      Staff: CRNA         No notable events documented.     BP   129/80   Temp 97   Pulse 85   Resp 16   SpO2 99

## 2023-08-14 NOTE — ANESTHESIA PREPROCEDURE EVALUATION
Procedure:  COLONOSCOPY    Relevant Problems   CARDIO   (+) Essential hypertension      MUSCULOSKELETAL   (+) Primary localized osteoarthritis of left knee   (+) Primary osteoarthritis of right knee      PULMONARY   (+) BILLY (obstructive sleep apnea)        Physical Exam    Airway    Mallampati score: II  TM Distance: >3 FB  Neck ROM: full     Dental   No notable dental hx     Cardiovascular  Cardiovascular exam normal    Pulmonary  Pulmonary exam normal     Other Findings        Anesthesia Plan  ASA Score- 2     Anesthesia Type- IV sedation with anesthesia with ASA Monitors. Additional Monitors:   Airway Plan:           Plan Factors-Exercise tolerance (METS): >4 METS. Chart reviewed. Existing labs reviewed. Patient summary reviewed. Patient is not a current smoker. Obstructive sleep apnea risk education given perioperatively. Induction-     Postoperative Plan-     Informed Consent- Anesthetic plan and risks discussed with patient. I personally reviewed this patient with the CRNA. Discussed and agreed on the Anesthesia Plan with the CRNA. Kobi Flores

## 2023-08-14 NOTE — H&P
History and Physical -  [facility]  Na Gale 66 y.o. female MRN: [id number]                  HPI: Na Gale is a 66y.o. year old female who presents for history of polyp      REVIEW OF SYSTEMS: Per the HPI, and otherwise unremarkable.     Historical Information   Past Medical History:   Diagnosis Date   • Ear problems    • Hypertension      Past Surgical History:   Procedure Laterality Date   • CHOLECYSTECTOMY     • FOOT SURGERY     • TONSILLECTOMY       Social History   Social History     Substance and Sexual Activity   Alcohol Use Yes    Comment: 2 glasses most nights     Social History     Substance and Sexual Activity   Drug Use No     Social History     Tobacco Use   Smoking Status Former   • Packs/day: 0.25   • Years: 10.00   • Total pack years: 2.50   • Types: Cigarettes   • Start date: 2005   • Quit date: 6/15/2014   • Years since quittin.1   Smokeless Tobacco Never   Tobacco Comments    Casual smoker     Family History   Problem Relation Age of Onset   • Lung cancer Mother    • Hypertension Maternal Grandmother    • Diabetes Maternal Grandmother        Meds/Allergies       Current Outpatient Medications:   •  levothyroxine 25 mcg tablet  •  liraglutide (Saxenda) injection  •  metoprolol succinate (TOPROL-XL) 50 mg 24 hr tablet  •  valsartan-hydrochlorothiazide (DIOVAN-HCT) 320-25 MG per tablet  •  clindamycin (CLEOCIN) 300 MG capsule  •  clobetasol (TEMOVATE) 0.05 % cream  •  Insulin Pen Needle (Pen Needles) 32G X 4 MM MISC  •  polyethylene glycol (GOLYTELY) 4000 mL solution    Current Facility-Administered Medications:   •  lactated ringers infusion, 125 mL/hr, Intravenous, Continuous    Allergies   Allergen Reactions   • Levofloxacin Anaphylaxis and Rash   • Quinolones Anaphylaxis   • Oxycodone-Acetaminophen Rash       Objective     /92   Pulse 89   Temp 97.7 °F (36.5 °C) (Temporal)   Resp 20   SpO2 96%       PHYSICAL EXAM    Gen: NAD  Head: NCAT  CV: RRR  CHEST: Clear  ABD: soft, NT/ND  EXT: no edema      ASSESSMENT/PLAN:  This is a 66y.o. year old female here for colonoscopy, and she is stable and optimized for her procedure.

## 2023-08-15 ENCOUNTER — OFFICE VISIT (OUTPATIENT)
Dept: FAMILY MEDICINE CLINIC | Facility: CLINIC | Age: 58
End: 2023-08-15
Payer: COMMERCIAL

## 2023-08-15 VITALS
BODY MASS INDEX: 40.62 KG/M2 | HEIGHT: 68 IN | HEART RATE: 84 BPM | RESPIRATION RATE: 18 BRPM | SYSTOLIC BLOOD PRESSURE: 130 MMHG | DIASTOLIC BLOOD PRESSURE: 90 MMHG | WEIGHT: 268 LBS | TEMPERATURE: 98.9 F

## 2023-08-15 DIAGNOSIS — R73.01 IMPAIRED FASTING GLUCOSE: ICD-10-CM

## 2023-08-15 DIAGNOSIS — I10 ESSENTIAL HYPERTENSION: Primary | ICD-10-CM

## 2023-08-15 DIAGNOSIS — E66.01 MORBID OBESITY (HCC): ICD-10-CM

## 2023-08-15 PROCEDURE — 99214 OFFICE O/P EST MOD 30 MIN: CPT | Performed by: NURSE PRACTITIONER

## 2023-08-15 NOTE — PROGRESS NOTES
Assessment/Plan:    1. Essential hypertension  Assessment & Plan:  Borderline. She will continue with dietary tang and exercise. Will continue to monitor. Orders:  -     Comprehensive metabolic panel; Future; Expected date: 02/15/2024  -     Lipid panel; Future; Expected date: 02/15/2024  -     Hemoglobin A1C; Future; Expected date: 02/15/2024  -     Comprehensive metabolic panel  -     Lipid panel  -     Hemoglobin A1C    2. Morbid obesity [facility])  Assessment & Plan:  Continue Saxenda. Her cholesterol and blood sugar have significantly improved. Orders:  -     Comprehensive metabolic panel; Future; Expected date: 02/15/2024  -     Lipid panel; Future; Expected date: 02/15/2024  -     Hemoglobin A1C; Future; Expected date: 02/15/2024  -     Comprehensive metabolic panel  -     Lipid panel  -     Hemoglobin A1C    3. Impaired fasting glucose  Assessment & Plan:  A1c has decreased since starting Saxenda               There are no Patient Instructions on file for this visit. Return in about 6 months (around 2/15/2024). Subjective:      Patient ID: Adolph Suazo is a 66 y.o. female. Chief Complaint   Patient presents with   • Follow-up     Lw jimbo       Here today for follow up. Doing well overall, no concerns. Labs done prior to visit today. Having difficulty exercising due to knee issues. The following portions of the patient's history were reviewed and updated as appropriate: allergies, current medications, past family history, past medical history, past social history, past surgical history and problem list.    Review of Systems   Constitutional: Negative for chills, fatigue and fever. Respiratory: Negative for cough, shortness of breath and wheezing. Cardiovascular: Negative for chest pain, palpitations and leg swelling. Gastrointestinal: Negative for abdominal pain, diarrhea, nausea and vomiting. Skin: Negative for rash. Neurological: Negative for dizziness and headaches. Current Outpatient Medications   Medication Sig Dispense Refill   • clobetasol (TEMOVATE) 0.05 % cream APPLY TWICE A DAY FOR 14 DAYS TO AFFECTED AREA     • Insulin Pen Needle (Pen Needles) 32G X 4 MM MISC Use in the morning 30 each 3   • levothyroxine 25 mcg tablet Take 25 mcg by mouth daily    5   • liraglutide (Saxenda) injection Inject 0.5 mL (3 mg total) under the skin daily 15 mL 3   • metoprolol succinate (TOPROL-XL) 50 mg 24 hr tablet take 1 tablet by mouth once daily 30 tablet 3   • valsartan-hydrochlorothiazide (DIOVAN-HCT) 320-25 MG per tablet Take 1 tablet by mouth daily 30 tablet 5   • clindamycin (CLEOCIN) 300 MG capsule take 2 capsules by mouth 1 hour prior to appointment       No current facility-administered medications for this visit. Facility-Administered Medications Ordered in Other Visits   Medication Dose Route Frequency Provider Last Rate Last Admin   • lactated ringers infusion  125 mL/hr Intravenous Continuous Gabby Lopez  mL/hr at 08/14/23 1006 125 mL/hr at 08/14/23 1006       Objective:    /90   Pulse 84   Temp 98.9 °F (37.2 °C) (Tympanic)   Resp 18   Ht 5' 8" (1.727 m)   Wt 122 kg (268 lb)   BMI 40.75 kg/m²        Physical Exam  Vitals and nursing note reviewed. Constitutional:       Appearance: Normal appearance. She is well-developed. Cardiovascular:      Rate and Rhythm: Normal rate and regular rhythm. Pulses: Normal pulses. Heart sounds: Normal heart sounds. No murmur heard. Pulmonary:      Effort: Pulmonary effort is normal.      Breath sounds: Normal breath sounds. Skin:     General: Skin is warm and dry. Neurological:      Mental Status: She is alert.    Psychiatric:         Mood and Affect: Mood normal.         Behavior: Behavior normal.                KAREEM Delgado

## 2023-08-16 PROCEDURE — 88305 TISSUE EXAM BY PATHOLOGIST: CPT | Performed by: PATHOLOGY

## 2023-10-24 DIAGNOSIS — E66.01 MORBID OBESITY (HCC): ICD-10-CM

## 2023-10-24 RX ORDER — PEN NEEDLE, DIABETIC 32GX 5/32"
NEEDLE, DISPOSABLE MISCELLANEOUS
Qty: 100 EACH | Refills: 0 | Status: SHIPPED | OUTPATIENT
Start: 2023-10-24

## 2024-02-08 ENCOUNTER — RA CDI HCC (OUTPATIENT)
Dept: OTHER | Facility: HOSPITAL | Age: 59
End: 2024-02-08

## 2024-02-08 NOTE — PROGRESS NOTES
HCC coding opportunities          Chart Reviewed number of suggestions sent to Provider: 1  E66.01     Patients Insurance        Commercial Insurance: [employer]

## 2024-04-13 DIAGNOSIS — I10 ESSENTIAL HYPERTENSION: ICD-10-CM

## 2024-04-15 RX ORDER — METOPROLOL SUCCINATE 50 MG/1
TABLET, EXTENDED RELEASE ORAL
Qty: 30 TABLET | Refills: 1 | Status: SHIPPED | OUTPATIENT
Start: 2024-04-15

## 2024-05-03 DIAGNOSIS — E66.01 MORBID OBESITY (HCC): ICD-10-CM

## 2024-05-03 RX ORDER — LIRAGLUTIDE 6 MG/ML
3 INJECTION, SOLUTION SUBCUTANEOUS DAILY
Qty: 15 ML | Refills: 3 | Status: SHIPPED | OUTPATIENT
Start: 2024-05-03

## 2024-05-04 DIAGNOSIS — I10 ESSENTIAL HYPERTENSION: ICD-10-CM

## 2024-05-06 RX ORDER — VALSARTAN AND HYDROCHLOROTHIAZIDE 320; 25 MG/1; MG/1
1 TABLET, FILM COATED ORAL DAILY
Qty: 30 TABLET | Refills: 5 | Status: SHIPPED | OUTPATIENT
Start: 2024-05-06

## 2024-05-13 ENCOUNTER — TELEPHONE (OUTPATIENT)
Dept: FAMILY MEDICINE CLINIC | Facility: CLINIC | Age: 59
End: 2024-05-13

## 2024-05-13 NOTE — TELEPHONE ENCOUNTER
Received fax from pharmacy for Prior Authorization.    Medication: Saxenda 18MG/3ML pen-injectors    Key: [id number]

## 2024-05-17 NOTE — TELEPHONE ENCOUNTER
SARAH Hart    Submitted via    []CMM-KEY   [x]Surescripts-Case ID # [id number]  []Faxed to plan   []Other website   []Phone call Case ID #     Office notes sent, clinical questions answered. Awaiting determination    Turnaround time for your insurance to make a decision on your Prior Authorization can take 7-21 business days.

## 2024-05-20 NOTE — TELEPHONE ENCOUNTER
SARAH DOMÍNGUEZ  Approved     Date(s) approvedApproved from 4/17/24 to 5/20/2025         Patient advised by          [] MyChart Message  [] Phone call   [x]LMOM  []L/M to call office as no active Communication consent on file  []Unable to leave detailed message as VM not approved on Communication consent       Pharmacy advised by    [x]Fax  []Phone call    Approval letter scanned into Media No

## 2024-05-29 DIAGNOSIS — Z00.6 ENCOUNTER FOR EXAMINATION FOR NORMAL COMPARISON OR CONTROL IN CLINICAL RESEARCH PROGRAM: ICD-10-CM

## 2024-06-07 DIAGNOSIS — I10 ESSENTIAL HYPERTENSION: ICD-10-CM

## 2024-06-07 RX ORDER — METOPROLOL SUCCINATE 50 MG/1
TABLET, EXTENDED RELEASE ORAL
Qty: 30 TABLET | Refills: 1 | Status: SHIPPED | OUTPATIENT
Start: 2024-06-07

## 2024-07-25 ENCOUNTER — TELEPHONE (OUTPATIENT)
Dept: FAMILY MEDICINE CLINIC | Facility: CLINIC | Age: 59
End: 2024-07-25

## 2024-08-08 NOTE — TELEPHONE ENCOUNTER
Duplicate encounter created, please see telephone encounter from 5/13/2024 regarding Tanner PA status. Please review patient's chart to see if there is already an encounter regarding the medication in question and to document anything regarding this medication in regards to anything regarding the authorization process etc before creating another encounter Thank You.

## 2024-08-09 DIAGNOSIS — I10 ESSENTIAL HYPERTENSION: ICD-10-CM

## 2024-08-19 RX ORDER — METOPROLOL SUCCINATE 50 MG/1
TABLET, EXTENDED RELEASE ORAL
Qty: 30 TABLET | Refills: 0 | Status: SHIPPED | OUTPATIENT
Start: 2024-08-19 | End: 2024-08-22 | Stop reason: SDUPTHER

## 2024-08-22 DIAGNOSIS — I10 ESSENTIAL HYPERTENSION: ICD-10-CM

## 2024-08-22 RX ORDER — METOPROLOL SUCCINATE 50 MG/1
50 TABLET, EXTENDED RELEASE ORAL DAILY
Qty: 30 TABLET | Refills: 0 | Status: SHIPPED | OUTPATIENT
Start: 2024-08-22 | End: 2024-08-26 | Stop reason: SDUPTHER

## 2024-08-22 RX ORDER — VALSARTAN AND HYDROCHLOROTHIAZIDE 320; 25 MG/1; MG/1
1 TABLET, FILM COATED ORAL DAILY
Qty: 30 TABLET | Refills: 0 | Status: SHIPPED | OUTPATIENT
Start: 2024-08-22 | End: 2024-08-26 | Stop reason: SDUPTHER

## 2024-08-26 ENCOUNTER — OFFICE VISIT (OUTPATIENT)
Dept: FAMILY MEDICINE CLINIC | Facility: CLINIC | Age: 59
End: 2024-08-26
Payer: COMMERCIAL

## 2024-08-26 VITALS
HEART RATE: 83 BPM | DIASTOLIC BLOOD PRESSURE: 84 MMHG | TEMPERATURE: 98 F | RESPIRATION RATE: 16 BRPM | BODY MASS INDEX: 41.28 KG/M2 | SYSTOLIC BLOOD PRESSURE: 124 MMHG | WEIGHT: 263 LBS | HEIGHT: 67 IN

## 2024-08-26 DIAGNOSIS — Z13.6 SCREENING FOR CARDIOVASCULAR CONDITION: ICD-10-CM

## 2024-08-26 DIAGNOSIS — Z23 ENCOUNTER FOR IMMUNIZATION: ICD-10-CM

## 2024-08-26 DIAGNOSIS — Z23 NEED FOR VACCINATION: ICD-10-CM

## 2024-08-26 DIAGNOSIS — E66.01 CLASS 3 SEVERE OBESITY DUE TO EXCESS CALORIES WITH SERIOUS COMORBIDITY AND BODY MASS INDEX (BMI) OF 40.0 TO 44.9 IN ADULT (HCC): ICD-10-CM

## 2024-08-26 DIAGNOSIS — E78.5 DYSLIPIDEMIA: ICD-10-CM

## 2024-08-26 DIAGNOSIS — G47.33 OSA (OBSTRUCTIVE SLEEP APNEA): ICD-10-CM

## 2024-08-26 DIAGNOSIS — Z13.29 SCREENING FOR THYROID DISORDER: ICD-10-CM

## 2024-08-26 DIAGNOSIS — Z00.00 ROUTINE ADULT HEALTH MAINTENANCE: Primary | ICD-10-CM

## 2024-08-26 DIAGNOSIS — D35.2 PITUITARY ADENOMA (HCC): ICD-10-CM

## 2024-08-26 DIAGNOSIS — I10 ESSENTIAL HYPERTENSION: ICD-10-CM

## 2024-08-26 DIAGNOSIS — E66.01 MORBID OBESITY (HCC): ICD-10-CM

## 2024-08-26 DIAGNOSIS — E04.2 NONTOXIC MULTINODULAR GOITER: ICD-10-CM

## 2024-08-26 DIAGNOSIS — R73.01 IMPAIRED FASTING GLUCOSE: ICD-10-CM

## 2024-08-26 PROCEDURE — 90750 HZV VACC RECOMBINANT IM: CPT

## 2024-08-26 PROCEDURE — 90715 TDAP VACCINE 7 YRS/> IM: CPT

## 2024-08-26 PROCEDURE — 90472 IMMUNIZATION ADMIN EACH ADD: CPT

## 2024-08-26 PROCEDURE — 90471 IMMUNIZATION ADMIN: CPT

## 2024-08-26 PROCEDURE — 99396 PREV VISIT EST AGE 40-64: CPT | Performed by: NURSE PRACTITIONER

## 2024-08-26 RX ORDER — METOPROLOL SUCCINATE 50 MG/1
50 TABLET, EXTENDED RELEASE ORAL DAILY
Qty: 90 TABLET | Refills: 1 | Status: SHIPPED | OUTPATIENT
Start: 2024-08-26

## 2024-08-26 RX ORDER — VALSARTAN AND HYDROCHLOROTHIAZIDE 320; 25 MG/1; MG/1
1 TABLET, FILM COATED ORAL DAILY
Qty: 90 TABLET | Refills: 1 | Status: SHIPPED | OUTPATIENT
Start: 2024-08-26

## 2024-08-26 RX ORDER — ATORVASTATIN CALCIUM 10 MG/1
10 TABLET, FILM COATED ORAL DAILY
Qty: 30 TABLET | Refills: 2 | Status: SHIPPED | OUTPATIENT
Start: 2024-08-26 | End: 2024-08-26 | Stop reason: SDUPTHER

## 2024-08-26 RX ORDER — ATORVASTATIN CALCIUM 10 MG/1
10 TABLET, FILM COATED ORAL DAILY
Qty: 90 TABLET | Refills: 1 | Status: SHIPPED | OUTPATIENT
Start: 2024-08-26

## 2024-08-26 RX ORDER — LEVOTHYROXINE SODIUM 25 UG/1
25 TABLET ORAL DAILY
Qty: 90 TABLET | Refills: 1 | Status: SHIPPED | OUTPATIENT
Start: 2024-08-26

## 2024-08-26 NOTE — PROGRESS NOTES
Adult Annual Physical  Name: Kacey Seay      : 1965      MRN: [id number]  Encounter Provider: KAREEM Hester  Encounter Date: 2024   Encounter department: [facility]    Assessment & Plan   1. Routine adult health maintenance  2. Impaired fasting glucose  Assessment & Plan:  A1c ordered.  Plan to resume GLP 1 agonist pending insurance coverage  Orders:  -     Hemoglobin A1C; Future  -     Lipid Panel with Direct LDL reflex; Future  -     Comprehensive metabolic panel; Future  -     Hemoglobin A1C  -     Lipid Panel with Direct LDL reflex  -     Comprehensive metabolic panel  -     Lipid panel; Future; Expected date: 10/26/2024  -     Lipid panel  -     Semaglutide-Weight Management (WEGOVY) 0.25 MG/0.5ML; Inject 0.5 mL (0.25 mg total) under the skin once a week for 28 days  -     Semaglutide-Weight Management (WEGOVY) 0.5 MG/0.5ML; Inject 0.5 mL (0.5 mg total) under the skin once a week for 28 days Do not start before 2024.  3. Essential hypertension  Assessment & Plan:  Stable on current medications  Orders:  -     Hemoglobin A1C; Future  -     Lipid Panel with Direct LDL reflex; Future  -     Comprehensive metabolic panel; Future  -     Hemoglobin A1C  -     Lipid Panel with Direct LDL reflex  -     Comprehensive metabolic panel  -     valsartan-hydrochlorothiazide (DIOVAN-HCT) 320-25 MG per tablet; Take 1 tablet by mouth daily  -     metoprolol succinate (TOPROL-XL) 50 mg 24 hr tablet; Take 1 tablet (50 mg total) by mouth daily  -     Lipid panel; Future; Expected date: 10/26/2024  -     Lipid panel  4. Pituitary adenoma (HCC)  5. Dyslipidemia  Assessment & Plan:  Will check labs now including lipid and CMP given elevated LFTs.  She can then start Lipitor and repeat labs in 2 months prior to follow-up  Orders:  -     atorvastatin (LIPITOR) 10 mg tablet; Take 1 tablet (10 mg total) by mouth daily  -     Lipid panel; Future; Expected date: 10/26/2024  -      Lipid panel  -     Semaglutide-Weight Management (WEGOVY) 0.25 MG/0.5ML; Inject 0.5 mL (0.25 mg total) under the skin once a week for 28 days  -     Semaglutide-Weight Management (WEGOVY) 0.5 MG/0.5ML; Inject 0.5 mL (0.5 mg total) under the skin once a week for 28 days Do not start before September 23, 2024.  6. Screening for thyroid disorder  -     TSH, 3rd generation with Free T4 reflex; Future  -     TSH, 3rd generation with Free T4 reflex  7. Need for vaccination  8. Encounter for immunization  -     TDAP VACCINE GREATER THAN OR EQUAL TO 6YO IM  -     Zoster Vaccine Recombinant IM  9. Nontoxic multinodular goiter  Assessment & Plan:  Labs ordered.  Reviewed previous thyroid ultrasound, no further follow-up indicated  Orders:  -     levothyroxine 25 mcg tablet; Take 1 tablet (25 mcg total) by mouth daily  10. Screening for cardiovascular condition  -     Comprehensive metabolic panel; Future; Expected date: 10/26/2024  -     Comprehensive metabolic panel  11. Class 3 severe obesity due to excess calories with serious comorbidity and body mass index (BMI) of 40.0 to 44.9 in adult (HCC)  -     Semaglutide-Weight Management (WEGOVY) 0.25 MG/0.5ML; Inject 0.5 mL (0.25 mg total) under the skin once a week for 28 days  -     Semaglutide-Weight Management (WEGOVY) 0.5 MG/0.5ML; Inject 0.5 mL (0.5 mg total) under the skin once a week for 28 days Do not start before September 23, 2024.  12. BILLY (obstructive sleep apnea)  Assessment & Plan:  She plans to follow-up with sleep medicine now that she is retired  13. Morbid obesity (HCC)  Assessment & Plan:  Was having success on Saxenda last year.  Will check coverage for Wegovy.  Will have her return in 2 months for weight check and medication review  Immunizations and preventive care screenings were discussed with patient today. Appropriate education was printed on patient's after visit summary.    Counseling:  Dental Health: discussed importance of regular tooth brushing,  flossing, and dental visits.  Exercise: the importance of regular exercise/physical activity was discussed. Recommend exercise 3-5 times per week for at least 30 minutes.          History of Present Illness     Adult Annual Physical:  Patient presents for annual physical. Here today for CPE.  She is doing well overall.  She recently retired.     Diet and Physical Activity:  - Diet/Nutrition: well balanced diet.  - Exercise: no formal exercise. Plans to start aquatic therapy at the [facility]    Depression Screening:  - PHQ-2 Score: 0    General Health:  - Sleep: sleeps poorly. History BILLY, cannot tolerate CPAP  - Hearing: normal hearing bilateral ears.  - Vision: goes for regular eye exams.  - Dental: regular dental visits and brushes teeth twice daily.    /GYN Health:  - Follows with GYN: yes.   - Menopause: postmenopausal.     Review of Systems   Constitutional: Negative.    Respiratory: Negative.     Cardiovascular: Negative.    Neurological: Negative.      Current Outpatient Medications on File Prior to Visit   Medication Sig Dispense Refill   • clobetasol (TEMOVATE) 0.05 % cream APPLY TWICE A DAY FOR 14 DAYS TO AFFECTED AREA     • Insulin Pen Needle (Droplet Pen Needles) 32G X 4 MM MISC use 1 PEN NEEDLE to inject MEDICATION subcutaneously every morning 100 each 0   • [DISCONTINUED] levothyroxine 25 mcg tablet Take 25 mcg by mouth daily    5   • [DISCONTINUED] metoprolol succinate (TOPROL-XL) 50 mg 24 hr tablet Take 1 tablet (50 mg total) by mouth daily 30 tablet 0   • [DISCONTINUED] Saxenda injection INJECT 0.5 ML (3 MG TOTAL) UNDER THE SKIN DAILY 15 mL 3   • [DISCONTINUED] valsartan-hydrochlorothiazide (DIOVAN-HCT) 320-25 MG per tablet Take 1 tablet by mouth daily 30 tablet 0   • [DISCONTINUED] clindamycin (CLEOCIN) 300 MG capsule take 2 capsules by mouth 1 hour prior to appointment (Patient not taking: Reported on 8/26/2024)       No current facility-administered medications on file prior to visit.      Social  "History     Tobacco Use   • Smoking status: Former     Current packs/day: 0.00     Average packs/day: 0.3 packs/day for 10.0 years (2.5 ttl pk-yrs)     Types: Cigarettes     Start date: 1/2/2005     Quit date: 6/15/2014     Years since quitting: 10.2     Passive exposure: Past   • Smokeless tobacco: Never   • Tobacco comments:     Casual smoker   Vaping Use   • Vaping status: Never Used   Substance and Sexual Activity   • Alcohol use: Yes     Comment: 2 glasses most nights   • Drug use: No   • Sexual activity: Not on file       Objective     /84   Pulse 83   Temp 98 °F (36.7 °C)   Resp 16   Ht 5' 6.75\" (1.695 m)   Wt 119 kg (263 lb)   BMI 41.50 kg/m²     Physical Exam  Vitals and nursing note reviewed.   Constitutional:       Appearance: Normal appearance. She is well-developed. She is obese.   HENT:      Head: Normocephalic and atraumatic.      Right Ear: Tympanic membrane and external ear normal.      Left Ear: Tympanic membrane and external ear normal.      Nose: Nose normal.      Mouth/Throat:      Pharynx: Oropharynx is clear.   Eyes:      Extraocular Movements: Extraocular movements intact.      Conjunctiva/sclera: Conjunctivae normal.      Pupils: Pupils are equal, round, and reactive to light.   Neck:      Thyroid: No thyromegaly.      Vascular: No carotid bruit.   Cardiovascular:      Rate and Rhythm: Normal rate and regular rhythm.      Pulses: Normal pulses.      Heart sounds: Normal heart sounds. No murmur heard.  Pulmonary:      Effort: Pulmonary effort is normal.      Breath sounds: Normal breath sounds.   Abdominal:      General: Bowel sounds are normal. There is no distension.      Palpations: Abdomen is soft.      Tenderness: There is no abdominal tenderness.   Musculoskeletal:         General: No deformity. Normal range of motion.      Cervical back: Normal range of motion and neck supple.   Lymphadenopathy:      Cervical: No cervical adenopathy.   Skin:     General: Skin is warm and " dry.      Capillary Refill: Capillary refill takes less than 2 seconds.   Neurological:      Mental Status: She is alert.      Sensory: No sensory deficit.      Motor: No abnormal muscle tone.      Coordination: Coordination normal.      Deep Tendon Reflexes: Reflexes normal.   Psychiatric:         Mood and Affect: Mood normal.         Behavior: Behavior normal.

## 2024-08-26 NOTE — ASSESSMENT & PLAN NOTE
Was having success on Saxenda last year.  Will check coverage for Wegovy.  Will have her return in 2 months for weight check and medication review

## 2024-08-26 NOTE — ASSESSMENT & PLAN NOTE
Will check labs now including lipid and CMP given elevated LFTs.  She can then start Lipitor and repeat labs in 2 months prior to follow-up

## 2024-08-27 ENCOUNTER — TELEPHONE (OUTPATIENT)
Age: 59
End: 2024-08-27

## 2024-08-27 NOTE — TELEPHONE ENCOUNTER
Reason for call:   [x] Prior Auth  [] Other:     Caller:  [x] Patient  [] Pharmacy  Name: [id number] - [address] ([facility])   Address:   Callback Number:     Medication: Semaglutide-Weight Management (WEGOVY) 0.25 MG/0.5ML     Dose/Frequency: Inject 0.5 mL (0.25 mg total) under the skin once a week for 28 days,     Quantity: 2 mL    Ordering Provider:   [x] PCP/Provider -   [] Speciality/Provider -     Has the patient tried other medications and failed? If failed, which medications did they fail?    [] No   [] Yes -     Is the patient's insurance updated in EPIC?   [] Yes   [] No     Is a copy of the patient's insurance scanned in EPIC?   [] Yes   [] No

## 2024-08-29 ENCOUNTER — TELEPHONE (OUTPATIENT)
Dept: FAMILY MEDICINE CLINIC | Facility: CLINIC | Age: 59
End: 2024-08-29

## 2024-08-29 LAB
ALBUMIN SERPL-MCNC: 4.2 G/DL (ref 3.8–4.9)
ALP SERPL-CCNC: 69 IU/L (ref 44–121)
ALT SERPL-CCNC: 39 IU/L (ref 0–32)
AST SERPL-CCNC: 32 IU/L (ref 0–40)
BILIRUB SERPL-MCNC: 0.3 MG/DL (ref 0–1.2)
BUN SERPL-MCNC: 16 MG/DL (ref 6–24)
BUN/CREAT SERPL: 22 (ref 9–23)
CALCIUM SERPL-MCNC: 9.6 MG/DL (ref 8.7–10.2)
CHLORIDE SERPL-SCNC: 104 MMOL/L (ref 96–106)
CHOLEST SERPL-MCNC: 219 MG/DL (ref 100–199)
CO2 SERPL-SCNC: 26 MMOL/L (ref 20–29)
CREAT SERPL-MCNC: 0.72 MG/DL (ref 0.57–1)
EGFR: 97 ML/MIN/1.73
EST. AVERAGE GLUCOSE BLD GHB EST-MCNC: 117 MG/DL
GLOBULIN SER-MCNC: 2.7 G/DL (ref 1.5–4.5)
GLUCOSE SERPL-MCNC: 113 MG/DL (ref 70–99)
HBA1C MFR BLD: 5.7 % (ref 4.8–5.6)
HDLC SERPL-MCNC: 58 MG/DL
LDLC SERPL CALC-MCNC: 134 MG/DL (ref 0–99)
LDLC/HDLC SERPL: 2.3 RATIO (ref 0–3.2)
MICRODELETION SYND BLD/T FISH: NORMAL
POTASSIUM SERPL-SCNC: 4.5 MMOL/L (ref 3.5–5.2)
PROT SERPL-MCNC: 6.9 G/DL (ref 6–8.5)
SL AMB VLDL CHOLESTEROL CALC: 27 MG/DL (ref 5–40)
SODIUM SERPL-SCNC: 141 MMOL/L (ref 134–144)
TRIGL SERPL-MCNC: 151 MG/DL (ref 0–149)

## 2024-09-03 ENCOUNTER — TELEPHONE (OUTPATIENT)
Age: 59
End: 2024-09-03

## 2024-09-03 NOTE — TELEPHONE ENCOUNTER
Duplicate encounter created, please see telephone encounter from 9/3/24 regarding wegovy PA status. Please review patient's chart to see if there is already an encounter regarding the medication in question and to document anything regarding this medication in regards to anything regarding the authorization process etc before creating another encounter Thank You.

## 2024-09-03 NOTE — TELEPHONE ENCOUNTER
PA for wegovy 0.25 mg APPROVED     Date(s) approved September 3, 2024 to April 3, 2025     Case # [id number]     Patient advised by          [x][employer] Message  []Phone call   []LMOM  []L/M to call office as no active Communication consent on file  []Unable to leave detailed message as VM not approved on Communication consent       Pharmacy advised by    [x]Fax  []Phone call

## 2024-09-03 NOTE — TELEPHONE ENCOUNTER
PA for wegovy 0.25 mg SUBMITTED     via    []CMM-KEY:   [x]Gerardo-Case ID # [id number]  []Faxed to plan   []Other website   []Phone call Case ID #     Office notes sent, clinical questions answered. Awaiting determination    Turnaround time for your insurance to make a decision on your Prior Authorization can take 7-21 business days.

## 2024-09-04 NOTE — TELEPHONE ENCOUNTER
Duplicate encounter created, please see telephone encounter from 9/3 regarding saxenda PA status. Please review patient's chart to see if there is already an encounter regarding the medication in question and to document anything regarding this medication in regards to anything regarding the authorization process etc before creating another encounter Thank You.

## 2024-09-30 DIAGNOSIS — E66.01 CLASS 3 SEVERE OBESITY DUE TO EXCESS CALORIES WITH SERIOUS COMORBIDITY AND BODY MASS INDEX (BMI) OF 40.0 TO 44.9 IN ADULT (HCC): ICD-10-CM

## 2024-09-30 DIAGNOSIS — E78.5 DYSLIPIDEMIA: ICD-10-CM

## 2024-09-30 DIAGNOSIS — R73.01 IMPAIRED FASTING GLUCOSE: ICD-10-CM

## 2024-09-30 DIAGNOSIS — E66.813 CLASS 3 SEVERE OBESITY DUE TO EXCESS CALORIES WITH SERIOUS COMORBIDITY AND BODY MASS INDEX (BMI) OF 40.0 TO 44.9 IN ADULT (HCC): ICD-10-CM

## 2024-10-07 DIAGNOSIS — E66.813 CLASS 3 SEVERE OBESITY DUE TO EXCESS CALORIES WITH SERIOUS COMORBIDITY AND BODY MASS INDEX (BMI) OF 40.0 TO 44.9 IN ADULT (HCC): ICD-10-CM

## 2024-10-07 DIAGNOSIS — R73.01 IMPAIRED FASTING GLUCOSE: ICD-10-CM

## 2024-10-07 DIAGNOSIS — E66.01 CLASS 3 SEVERE OBESITY DUE TO EXCESS CALORIES WITH SERIOUS COMORBIDITY AND BODY MASS INDEX (BMI) OF 40.0 TO 44.9 IN ADULT (HCC): ICD-10-CM

## 2024-10-07 DIAGNOSIS — E78.5 DYSLIPIDEMIA: ICD-10-CM

## 2024-10-07 NOTE — TELEPHONE ENCOUNTER
Reason for call:   [x] Refill   [] Prior Auth  [] Other:     Office: [facility]   [x] PCP/Provider - Bell Grayson   [] Specialty/Provider -     Medication: wegovy     Dose/Frequency: 0.5 mg/ 0.5 ml/ Inject 0.5 mL (0.5 mg total) under the skin once a week     Quantity: 30 day supply     Pharmacy: [facility] in [place]     Does the patient have enough for 3 days?   [] Yes   [x] No - Send as HP to POD- patient is out completely.

## 2024-10-21 ENCOUNTER — RA CDI HCC (OUTPATIENT)
Dept: OTHER | Facility: HOSPITAL | Age: 59
End: 2024-10-21

## 2024-10-21 NOTE — PROGRESS NOTES
HCC coding opportunities       Chart reviewed, no opportunity found: CHART REVIEWED, NO OPPORTUNITY FOUND        Patients Insurance        Commercial Insurance: [email or web address]

## 2024-10-29 LAB
ALBUMIN SERPL-MCNC: 4.4 G/DL (ref 3.8–4.9)
ALP SERPL-CCNC: 69 IU/L (ref 44–121)
ALT SERPL-CCNC: 51 IU/L (ref 0–32)
AST SERPL-CCNC: 32 IU/L (ref 0–40)
BILIRUB SERPL-MCNC: 0.5 MG/DL (ref 0–1.2)
BUN SERPL-MCNC: 16 MG/DL (ref 6–24)
BUN/CREAT SERPL: 22 (ref 9–23)
CALCIUM SERPL-MCNC: 9.8 MG/DL (ref 8.7–10.2)
CHLORIDE SERPL-SCNC: 98 MMOL/L (ref 96–106)
CHOLEST SERPL-MCNC: 161 MG/DL (ref 100–199)
CHOLEST/HDLC SERPL: 2.9 RATIO (ref 0–4.4)
CO2 SERPL-SCNC: 26 MMOL/L (ref 20–29)
CREAT SERPL-MCNC: 0.72 MG/DL (ref 0.57–1)
EGFR: 97 ML/MIN/1.73
GLOBULIN SER-MCNC: 2.6 G/DL (ref 1.5–4.5)
GLUCOSE SERPL-MCNC: 93 MG/DL (ref 70–99)
HDLC SERPL-MCNC: 56 MG/DL
LDLC SERPL CALC-MCNC: 78 MG/DL (ref 0–99)
MICRODELETION SYND BLD/T FISH: NORMAL
POTASSIUM SERPL-SCNC: 3.8 MMOL/L (ref 3.5–5.2)
PROT SERPL-MCNC: 7 G/DL (ref 6–8.5)
SL AMB VLDL CHOLESTEROL CALC: 27 MG/DL (ref 5–40)
SODIUM SERPL-SCNC: 139 MMOL/L (ref 134–144)
TRIGL SERPL-MCNC: 156 MG/DL (ref 0–149)
TSH SERPL DL<=0.005 MIU/L-ACNC: 1.32 UIU/ML (ref 0.45–4.5)

## 2024-10-31 ENCOUNTER — TELEPHONE (OUTPATIENT)
Age: 59
End: 2024-10-31

## 2024-10-31 ENCOUNTER — OFFICE VISIT (OUTPATIENT)
Dept: FAMILY MEDICINE CLINIC | Facility: CLINIC | Age: 59
End: 2024-10-31
Payer: COMMERCIAL

## 2024-10-31 VITALS
RESPIRATION RATE: 17 BRPM | WEIGHT: 258 LBS | BODY MASS INDEX: 40.49 KG/M2 | HEART RATE: 84 BPM | DIASTOLIC BLOOD PRESSURE: 84 MMHG | SYSTOLIC BLOOD PRESSURE: 124 MMHG | TEMPERATURE: 96.6 F | HEIGHT: 67 IN

## 2024-10-31 DIAGNOSIS — E66.01 MORBID OBESITY (HCC): ICD-10-CM

## 2024-10-31 DIAGNOSIS — Z13.1 SCREENING FOR DIABETES MELLITUS: ICD-10-CM

## 2024-10-31 DIAGNOSIS — R73.01 IMPAIRED FASTING GLUCOSE: ICD-10-CM

## 2024-10-31 DIAGNOSIS — E66.813 CLASS 3 SEVERE OBESITY DUE TO EXCESS CALORIES WITH SERIOUS COMORBIDITY AND BODY MASS INDEX (BMI) OF 40.0 TO 44.9 IN ADULT (HCC): ICD-10-CM

## 2024-10-31 DIAGNOSIS — Z13.6 SCREENING FOR CARDIOVASCULAR CONDITION: Primary | ICD-10-CM

## 2024-10-31 DIAGNOSIS — E66.01 CLASS 3 SEVERE OBESITY DUE TO EXCESS CALORIES WITH SERIOUS COMORBIDITY AND BODY MASS INDEX (BMI) OF 40.0 TO 44.9 IN ADULT (HCC): ICD-10-CM

## 2024-10-31 DIAGNOSIS — E78.5 DYSLIPIDEMIA: ICD-10-CM

## 2024-10-31 PROCEDURE — 99214 OFFICE O/P EST MOD 30 MIN: CPT | Performed by: NURSE PRACTITIONER

## 2024-10-31 NOTE — TELEPHONE ENCOUNTER
Pt called in stating the pharmacy told her that her Wegovy can't be filled until 12/26/24 and this week was her last dosage. She's requesting a call back.

## 2024-10-31 NOTE — PROGRESS NOTES
Ambulatory Visit  Name: Kacey Seay      : 1965      MRN: [id number]  Encounter Provider: KAREEM Hester  Encounter Date: 10/31/2024   Encounter department: [facility]    Assessment & Plan  Impaired fasting glucose  Order given for repeat labs       Dyslipidemia  Improved on statin.        Class 3 severe obesity due to excess calories with serious comorbidity and body mass index (BMI) of 40.0 to 44.9 in adult (HCC)      Orders:    Semaglutide-Weight Management (WEGOVY) 1 MG/0.5ML; Inject 0.5 mL (1 mg total) under the skin once a week for 28 days Do not start before 2024.    Semaglutide-Weight Management (WEGOVY) 1.7 MG/0.75ML; Inject 0.75 mL (1.7 mg total) under the skin once a week for 28 days Do not start before 2025.    Semaglutide-Weight Management (WEGOVY) 2.4 MG/0.75ML; Inject 0.75 mL (2.4 mg total) under the skin once a week for 28 days Do not start before 2025.    Screening for cardiovascular condition         Screening for diabetes mellitus         Morbid obesity (HCC)  Tolerating Wegovy.  Next titration doses sent.          History of Present Illness     Here today for follow up.  Has lost a few pounds since starting Wegovy.  Tolerating this without side effects.  No significant changes to her eating patterns.  She is working on incorporating more exercise        History obtained from : patient  Review of Systems   Constitutional: Negative.    Respiratory: Negative.     Cardiovascular: Negative.    Gastrointestinal: Negative.    Neurological: Negative.      Current Outpatient Medications on File Prior to Visit   Medication Sig Dispense Refill    atorvastatin (LIPITOR) 10 mg tablet Take 1 tablet (10 mg total) by mouth daily 90 tablet 1    Insulin Pen Needle (Droplet Pen Needles) 32G X 4 MM MISC use 1 PEN NEEDLE to inject MEDICATION subcutaneously every morning 100 each 0    levothyroxine 25 mcg tablet Take 1 tablet (25 mcg total) by  "mouth daily 90 tablet 1    metoprolol succinate (TOPROL-XL) 50 mg 24 hr tablet Take 1 tablet (50 mg total) by mouth daily 90 tablet 1    Semaglutide-Weight Management (WEGOVY) 0.5 MG/0.5ML Inject 0.5 mL (0.5 mg total) under the skin once a week for 28 days 2 mL 0    valsartan-hydrochlorothiazide (DIOVAN-HCT) 320-25 MG per tablet Take 1 tablet by mouth daily 90 tablet 1    clobetasol (TEMOVATE) 0.05 % cream APPLY TWICE A DAY FOR 14 DAYS TO AFFECTED AREA (Patient not taking: Reported on 10/31/2024)       No current facility-administered medications on file prior to visit.      Social History     Tobacco Use    Smoking status: Former     Current packs/day: 0.00     Average packs/day: 0.3 packs/day for 10.0 years (2.5 ttl pk-yrs)     Types: Cigarettes     Start date: 1/2/2005     Quit date: 6/15/2014     Years since quitting: 10.3     Passive exposure: Past    Smokeless tobacco: Never    Tobacco comments:     Casual smoker   Vaping Use    Vaping status: Never Used   Substance and Sexual Activity    Alcohol use: Yes     Comment: 2 glasses most nights    Drug use: No    Sexual activity: Not on file         Objective     /84   Pulse 84   Temp (!) 96.6 °F (35.9 °C)   Resp 17   Ht 5' 6.75\" (1.695 m)   Wt 117 kg (258 lb)   BMI 40.71 kg/m²     Physical Exam  Constitutional:       General: She is not in acute distress.     Appearance: Normal appearance. She is well-developed. She is not diaphoretic.   Eyes:      Conjunctiva/sclera: Conjunctivae normal.   Pulmonary:      Effort: Pulmonary effort is normal. No respiratory distress.   Neurological:      Mental Status: She is alert.   Psychiatric:         Mood and Affect: Mood normal.         Behavior: Behavior normal.         "

## 2024-11-01 ENCOUNTER — TELEPHONE (OUTPATIENT)
Age: 59
End: 2024-11-01

## 2024-11-01 DIAGNOSIS — E66.813 CLASS 3 SEVERE OBESITY DUE TO EXCESS CALORIES WITH SERIOUS COMORBIDITY AND BODY MASS INDEX (BMI) OF 40.0 TO 44.9 IN ADULT (HCC): ICD-10-CM

## 2024-11-01 DIAGNOSIS — E66.01 CLASS 3 SEVERE OBESITY DUE TO EXCESS CALORIES WITH SERIOUS COMORBIDITY AND BODY MASS INDEX (BMI) OF 40.0 TO 44.9 IN ADULT (HCC): ICD-10-CM

## 2024-11-01 NOTE — TELEPHONE ENCOUNTER
Patient called the RX Refill Line. Message is being forwarded to the office.     Patient called in regards to her wegovy.  There is a lot of confusion at the pharmacy with this medication because there are multiple scripts there for her for multiple doses.  The dose she needs now though is not dated until 12/26/2024.   So, she is asking for a new script for Wegovy 1 mg be sent to the [facility] on [address].  The pharm has it in stock.     Please contact patient at [phone] with any questions

## 2024-11-01 NOTE — TELEPHONE ENCOUNTER
I can see in her chart the next dose is ordered to start 12/26/24 which is probably why she can't pick it up. Is she supposed to be getting the higher dose yet, or still getting the dose she has been on?     Pooja Mehta LPN

## 2024-11-25 ENCOUNTER — TELEPHONE (OUTPATIENT)
Dept: FAMILY MEDICINE CLINIC | Facility: CLINIC | Age: 59
End: 2024-11-25

## 2024-11-25 DIAGNOSIS — E66.813 CLASS 3 SEVERE OBESITY DUE TO EXCESS CALORIES WITH SERIOUS COMORBIDITY AND BODY MASS INDEX (BMI) OF 40.0 TO 44.9 IN ADULT (HCC): Primary | ICD-10-CM

## 2024-11-25 DIAGNOSIS — E66.01 CLASS 3 SEVERE OBESITY DUE TO EXCESS CALORIES WITH SERIOUS COMORBIDITY AND BODY MASS INDEX (BMI) OF 40.0 TO 44.9 IN ADULT (HCC): Primary | ICD-10-CM

## 2024-11-25 NOTE — TELEPHONE ENCOUNTER
Release dates on future wegovy scripts seem to be about a month off. Patient took her last injection yesterday (Sunday 11/24) but next script is not due to release until 12/26. Can the dates be updated so patient can have filled before next injection is due.

## 2024-12-26 DIAGNOSIS — E66.813 CLASS 3 SEVERE OBESITY DUE TO EXCESS CALORIES WITH SERIOUS COMORBIDITY AND BODY MASS INDEX (BMI) OF 40.0 TO 44.9 IN ADULT (HCC): ICD-10-CM

## 2024-12-26 DIAGNOSIS — E66.01 CLASS 3 SEVERE OBESITY DUE TO EXCESS CALORIES WITH SERIOUS COMORBIDITY AND BODY MASS INDEX (BMI) OF 40.0 TO 44.9 IN ADULT (HCC): ICD-10-CM

## 2024-12-26 NOTE — TELEPHONE ENCOUNTER
Reason for call:   [x] Refill   [] Prior Auth  [] Other:     Office:   [x] PCP/Provider -  KAREEM Hester   [] Specialty/Provider -     Medication: Semaglutide-Weight Management (WEGOVY) 2.4 MG/0.75ML     Dose/Frequency: 2.4 mg, Subcutaneous, Weekly     Quantity: 3 ml    Pharmacy: [id number] [address] ( [address])     Does the patient have enough for 3 days?   [x] Yes   [] No - Send as HP to POD

## 2025-01-18 DIAGNOSIS — E78.5 DYSLIPIDEMIA: ICD-10-CM

## 2025-01-18 DIAGNOSIS — I10 ESSENTIAL HYPERTENSION: ICD-10-CM

## 2025-01-18 DIAGNOSIS — E04.2 NONTOXIC MULTINODULAR GOITER: ICD-10-CM

## 2025-01-19 RX ORDER — LEVOTHYROXINE SODIUM 25 UG/1
25 TABLET ORAL DAILY
Qty: 90 TABLET | Refills: 1 | Status: SHIPPED | OUTPATIENT
Start: 2025-01-19

## 2025-01-19 RX ORDER — ATORVASTATIN CALCIUM 10 MG/1
10 TABLET, FILM COATED ORAL DAILY
Qty: 90 TABLET | Refills: 1 | Status: SHIPPED | OUTPATIENT
Start: 2025-01-19

## 2025-01-19 RX ORDER — VALSARTAN AND HYDROCHLOROTHIAZIDE 320; 25 MG/1; MG/1
1 TABLET, FILM COATED ORAL DAILY
Qty: 90 TABLET | Refills: 1 | Status: SHIPPED | OUTPATIENT
Start: 2025-01-19

## 2025-01-21 DIAGNOSIS — E66.813 CLASS 3 SEVERE OBESITY DUE TO EXCESS CALORIES WITH SERIOUS COMORBIDITY AND BODY MASS INDEX (BMI) OF 40.0 TO 44.9 IN ADULT (HCC): ICD-10-CM

## 2025-01-21 DIAGNOSIS — E66.01 CLASS 3 SEVERE OBESITY DUE TO EXCESS CALORIES WITH SERIOUS COMORBIDITY AND BODY MASS INDEX (BMI) OF 40.0 TO 44.9 IN ADULT (HCC): ICD-10-CM

## 2025-01-21 NOTE — TELEPHONE ENCOUNTER
Reason for call:   [x] Refill   [] Prior Auth  [] Other:     Office:   [x] PCP/Provider - Renuka  [] Specialty/Provider -     Medication: wegovy 2.4 mg     [facility]   [facility]     Does the patient have enough for 3 days?   [x] Yes   [] No - Send as HP to POD

## 2025-02-06 DIAGNOSIS — I10 ESSENTIAL HYPERTENSION: ICD-10-CM

## 2025-02-06 RX ORDER — METOPROLOL SUCCINATE 50 MG/1
50 TABLET, EXTENDED RELEASE ORAL DAILY
Qty: 90 TABLET | Refills: 1 | Status: SHIPPED | OUTPATIENT
Start: 2025-02-06

## 2025-02-18 DIAGNOSIS — E66.813 CLASS 3 SEVERE OBESITY DUE TO EXCESS CALORIES WITH SERIOUS COMORBIDITY AND BODY MASS INDEX (BMI) OF 40.0 TO 44.9 IN ADULT (HCC): ICD-10-CM

## 2025-02-18 DIAGNOSIS — E66.01 CLASS 3 SEVERE OBESITY DUE TO EXCESS CALORIES WITH SERIOUS COMORBIDITY AND BODY MASS INDEX (BMI) OF 40.0 TO 44.9 IN ADULT (HCC): ICD-10-CM

## 2025-02-18 NOTE — TELEPHONE ENCOUNTER
Reason for call:   [x] Refill   [] Prior Auth  [] Other:     Office:   [x] PCP/Provider -  KAREEM Hester / [facility]   [] Specialty/Provider -     Medication: Semaglutide-Weight Management (WEGOVY) 2.4 MG/0.75ML      Dose/Frequency: Inject 0.75 mL (2.4 mg total) under the skin once a week     Quantity: 3 ml    Pharmacy: [id number] [address] ( [address]      Does the patient have enough for 3 days?   [x] Yes   [] No - Send as HP to POD

## 2025-02-20 ENCOUNTER — RA CDI HCC (OUTPATIENT)
Dept: OTHER | Facility: HOSPITAL | Age: 60
End: 2025-02-20

## 2025-02-20 LAB
ALBUMIN SERPL-MCNC: 4.4 G/DL (ref 3.8–4.9)
ALP SERPL-CCNC: 62 IU/L (ref 44–121)
ALT SERPL-CCNC: 38 IU/L (ref 0–32)
AST SERPL-CCNC: 33 IU/L (ref 0–40)
BILIRUB SERPL-MCNC: 0.4 MG/DL (ref 0–1.2)
BUN SERPL-MCNC: 13 MG/DL (ref 6–24)
BUN/CREAT SERPL: 20 (ref 9–23)
CALCIUM SERPL-MCNC: 9.8 MG/DL (ref 8.7–10.2)
CHLORIDE SERPL-SCNC: 102 MMOL/L (ref 96–106)
CHOLEST SERPL-MCNC: 153 MG/DL (ref 100–199)
CO2 SERPL-SCNC: 27 MMOL/L (ref 20–29)
CREAT SERPL-MCNC: 0.65 MG/DL (ref 0.57–1)
EGFR: 102 ML/MIN/1.73
EST. AVERAGE GLUCOSE BLD GHB EST-MCNC: 120 MG/DL
GLOBULIN SER-MCNC: 2.5 G/DL (ref 1.5–4.5)
GLUCOSE SERPL-MCNC: 93 MG/DL (ref 70–99)
HBA1C MFR BLD: 5.8 % (ref 4.8–5.6)
HDLC SERPL-MCNC: 65 MG/DL
LDLC SERPL CALC-MCNC: 68 MG/DL (ref 0–99)
LDLC/HDLC SERPL: 1 RATIO (ref 0–3.2)
MICRODELETION SYND BLD/T FISH: NORMAL
POTASSIUM SERPL-SCNC: 4.3 MMOL/L (ref 3.5–5.2)
PROT SERPL-MCNC: 6.9 G/DL (ref 6–8.5)
SL AMB VLDL CHOLESTEROL CALC: 20 MG/DL (ref 5–40)
SODIUM SERPL-SCNC: 140 MMOL/L (ref 134–144)
TRIGL SERPL-MCNC: 114 MG/DL (ref 0–149)

## 2025-02-20 NOTE — PROGRESS NOTES
HCC coding opportunities       Chart reviewed, no opportunity found: CHART REVIEWED, NO OPPORTUNITY FOUND        Patients Insurance        Commercial Insurance: [employer]     E66.01

## 2025-02-27 ENCOUNTER — OFFICE VISIT (OUTPATIENT)
Dept: FAMILY MEDICINE CLINIC | Facility: CLINIC | Age: 60
End: 2025-02-27
Payer: COMMERCIAL

## 2025-02-27 VITALS
DIASTOLIC BLOOD PRESSURE: 84 MMHG | RESPIRATION RATE: 16 BRPM | BODY MASS INDEX: 39.08 KG/M2 | WEIGHT: 249 LBS | SYSTOLIC BLOOD PRESSURE: 120 MMHG | HEART RATE: 80 BPM | HEIGHT: 67 IN | TEMPERATURE: 96.9 F

## 2025-02-27 DIAGNOSIS — E66.01 MORBID OBESITY (HCC): Primary | ICD-10-CM

## 2025-02-27 DIAGNOSIS — I10 ESSENTIAL HYPERTENSION: ICD-10-CM

## 2025-02-27 DIAGNOSIS — G47.33 OSA (OBSTRUCTIVE SLEEP APNEA): ICD-10-CM

## 2025-02-27 DIAGNOSIS — M17.12 PRIMARY LOCALIZED OSTEOARTHRITIS OF LEFT KNEE: ICD-10-CM

## 2025-02-27 DIAGNOSIS — Z11.59 NEED FOR HEPATITIS C SCREENING TEST: ICD-10-CM

## 2025-02-27 DIAGNOSIS — R73.01 IMPAIRED FASTING GLUCOSE: ICD-10-CM

## 2025-02-27 DIAGNOSIS — Z23 ENCOUNTER FOR IMMUNIZATION: ICD-10-CM

## 2025-02-27 DIAGNOSIS — E04.2 NONTOXIC MULTINODULAR GOITER: ICD-10-CM

## 2025-02-27 PROCEDURE — 99214 OFFICE O/P EST MOD 30 MIN: CPT | Performed by: NURSE PRACTITIONER

## 2025-02-27 PROCEDURE — 90750 HZV VACC RECOMBINANT IM: CPT

## 2025-02-27 PROCEDURE — 90471 IMMUNIZATION ADMIN: CPT

## 2025-02-27 RX ORDER — CELECOXIB 100 MG/1
100 CAPSULE ORAL 2 TIMES DAILY
Qty: 60 CAPSULE | Refills: 3 | Status: SHIPPED | OUTPATIENT
Start: 2025-02-27

## 2025-02-27 NOTE — ASSESSMENT & PLAN NOTE
Start on Celebrex as needed  Orders:  •  celecoxib (CeleBREX) 100 mg capsule; Take 1 capsule (100 mg total) by mouth 2 (two) times a day

## 2025-02-27 NOTE — PROGRESS NOTES
"Name: Kacey Seay      : 1965      MRN: [id number]  Encounter Provider: KAREEM Hester  Encounter Date: 2025   Encounter department: Merged with [facility]  :  Assessment & Plan  Morbid obesity (HCC)  Continue Zepbound       Impaired fasting glucose    Orders:  •  Comprehensive metabolic panel; Future  •  Lipid Panel with Direct LDL reflex; Future  •  Hemoglobin A1C; Future    BILLY (obstructive sleep apnea)  She will schedule follow-up with sleep medicine to discuss treatment       Essential hypertension  stable       Primary localized osteoarthritis of left knee  Start on Celebrex as needed  Orders:  •  celecoxib (CeleBREX) 100 mg capsule; Take 1 capsule (100 mg total) by mouth 2 (two) times a day    Nontoxic multinodular goiter  Repeat labs ordered  Orders:  •  TSH, 3rd generation with Free T4 reflex; Future    Need for hepatitis C screening test    Orders:  •  Hepatitis C antibody; Future    Encounter for immunization    Orders:  •  Zoster Vaccine Recombinant IM           History of Present Illness   Here today for follow-up.  She is doing well overall, labs done prior.  She is continuing to lose weight on Zepbound, tolerating without any side effects.  Taking ibuprofen daily for joint pains.  Typically takes 600 mg/day,  in addition to the Advil p.m. at bedtime.  Mammo scheduled for April  Extremity symptoms.  Advil PM does not always work    Review of Systems   Constitutional: Negative.    Respiratory: Negative.     Gastrointestinal: Negative.    Musculoskeletal:  Positive for arthralgias.   Psychiatric/Behavioral:  Positive for sleep disturbance.    All other systems reviewed and are negative.      Objective   /84   Pulse 80   Temp (!) 96.9 °F (36.1 °C)   Resp 16   Ht 5' 6.75\" (1.695 m)   Wt 113 kg (249 lb)   BMI 39.29 kg/m²      Physical Exam  Vitals and nursing note reviewed.   Constitutional:       General: She is not in acute distress.     Appearance: Normal " appearance.   HENT:      Head: Normocephalic and atraumatic.   Eyes:      Conjunctiva/sclera: Conjunctivae normal.   Neck:      Vascular: No carotid bruit.   Cardiovascular:      Rate and Rhythm: Normal rate and regular rhythm.      Pulses: Normal pulses.      Heart sounds: Normal heart sounds. No murmur heard.  Pulmonary:      Effort: Pulmonary effort is normal.      Breath sounds: Normal breath sounds.   Skin:     General: Skin is warm and dry.   Neurological:      Mental Status: She is alert.   Psychiatric:         Mood and Affect: Mood normal.         Behavior: Behavior normal.

## 2025-02-27 NOTE — ASSESSMENT & PLAN NOTE
Orders:  •  Comprehensive metabolic panel; Future  •  Lipid Panel with Direct LDL reflex; Future  •  Hemoglobin A1C; Future

## 2025-03-19 DIAGNOSIS — E66.01 CLASS 3 SEVERE OBESITY DUE TO EXCESS CALORIES WITH SERIOUS COMORBIDITY AND BODY MASS INDEX (BMI) OF 40.0 TO 44.9 IN ADULT (HCC): ICD-10-CM

## 2025-03-19 DIAGNOSIS — E66.813 CLASS 3 SEVERE OBESITY DUE TO EXCESS CALORIES WITH SERIOUS COMORBIDITY AND BODY MASS INDEX (BMI) OF 40.0 TO 44.9 IN ADULT (HCC): ICD-10-CM

## 2025-04-10 DIAGNOSIS — E66.813 CLASS 3 SEVERE OBESITY DUE TO EXCESS CALORIES WITH SERIOUS COMORBIDITY AND BODY MASS INDEX (BMI) OF 40.0 TO 44.9 IN ADULT: ICD-10-CM

## 2025-04-11 ENCOUNTER — TELEPHONE (OUTPATIENT)
Age: 60
End: 2025-04-11

## 2025-04-11 NOTE — TELEPHONE ENCOUNTER
SARAH LUCIO 2.4 MG/0.75ML SUBMITTED     to [employer]     via    [x]CMM-KEY: [id number]  []Surescripts-Case ID #    []Availity-Auth ID #  NDC #    []Faxed to plan    []Other website    []Phone call Case ID #      []PA sent as URGENT    All office notes, labs and other pertaining documents and studies sent. Clinical questions answered. Awaiting determination from insurance company.     Turnaround time for your insurance to make a decision on your Prior Authorization can take 7-21 business days.

## 2025-04-14 ENCOUNTER — TELEPHONE (OUTPATIENT)
Age: 60
End: 2025-04-14

## 2025-04-14 NOTE — TELEPHONE ENCOUNTER
PA for WEGOVY 2.4MG SUBMITTED to [employer]    via    [x]CMM-KEY: [id number]      [x]PA sent as URGENT    All office notes, labs and other pertaining documents and studies sent. Clinical questions answered. Awaiting determination from insurance company.     Turnaround time for your insurance to make a decision on your Prior Authorization can take 7-21 business days.

## 2025-04-16 NOTE — TELEPHONE ENCOUNTER
PA WEGOVY 2.4 MG/0.75ML APPROVED     Date(s) approved Expiration Date: October 11, 2025    Case # [id number]    Patient advised by          [x]MyChart Message  []Phone call   []LMOM  []L/M to call office as no active Communication consent on file  []Unable to leave detailed message as VM not approved on Communication consent       Pharmacy advised by    [x]Fax  []Phone call  []Secure Chat    Specialty Pharmacy    []

## 2025-05-07 DIAGNOSIS — E66.813 CLASS 3 SEVERE OBESITY DUE TO EXCESS CALORIES WITH SERIOUS COMORBIDITY AND BODY MASS INDEX (BMI) OF 40.0 TO 44.9 IN ADULT: ICD-10-CM

## 2025-05-07 RX ORDER — SEMAGLUTIDE 2.4 MG/.75ML
2.4 INJECTION, SOLUTION SUBCUTANEOUS WEEKLY
Qty: 3 ML | Refills: 0 | Status: SHIPPED | OUTPATIENT
Start: 2025-05-07 | End: 2025-05-07 | Stop reason: SDUPTHER

## 2025-05-08 RX ORDER — SEMAGLUTIDE 2.4 MG/.75ML
2.4 INJECTION, SOLUTION SUBCUTANEOUS WEEKLY
Qty: 3 ML | Refills: 0 | Status: SHIPPED | OUTPATIENT
Start: 2025-05-08

## 2025-06-04 DIAGNOSIS — E66.813 CLASS 3 SEVERE OBESITY DUE TO EXCESS CALORIES WITH SERIOUS COMORBIDITY AND BODY MASS INDEX (BMI) OF 40.0 TO 44.9 IN ADULT: ICD-10-CM

## 2025-06-05 RX ORDER — SEMAGLUTIDE 2.4 MG/.75ML
INJECTION, SOLUTION SUBCUTANEOUS
Qty: 3 ML | Refills: 0 | Status: SHIPPED | OUTPATIENT
Start: 2025-06-05

## 2025-06-19 ENCOUNTER — RA CDI HCC (OUTPATIENT)
Dept: OTHER | Facility: HOSPITAL | Age: 60
End: 2025-06-19

## 2025-06-19 DIAGNOSIS — I10 ESSENTIAL HYPERTENSION: ICD-10-CM

## 2025-06-19 DIAGNOSIS — E04.2 NONTOXIC MULTINODULAR GOITER: ICD-10-CM

## 2025-06-19 DIAGNOSIS — E78.5 DYSLIPIDEMIA: ICD-10-CM

## 2025-06-19 NOTE — PROGRESS NOTES
HCC coding opportunities       Chart reviewed, no opportunity found: CHART REVIEWED, NO OPPORTUNITY FOUND        Patients Insurance        Commercial Insurance: [employer]

## 2025-06-20 RX ORDER — VALSARTAN AND HYDROCHLOROTHIAZIDE 320; 25 MG/1; MG/1
1 TABLET, FILM COATED ORAL DAILY
Qty: 90 TABLET | Refills: 0 | Status: SHIPPED | OUTPATIENT
Start: 2025-06-20

## 2025-06-20 RX ORDER — LEVOTHYROXINE SODIUM 25 UG/1
25 TABLET ORAL DAILY
Qty: 90 TABLET | Refills: 0 | Status: SHIPPED | OUTPATIENT
Start: 2025-06-20

## 2025-06-20 RX ORDER — ATORVASTATIN CALCIUM 10 MG/1
10 TABLET, FILM COATED ORAL DAILY
Qty: 90 TABLET | Refills: 0 | Status: SHIPPED | OUTPATIENT
Start: 2025-06-20

## 2025-06-20 RX ORDER — METOPROLOL SUCCINATE 50 MG/1
50 TABLET, EXTENDED RELEASE ORAL DAILY
Qty: 90 TABLET | Refills: 0 | Status: SHIPPED | OUTPATIENT
Start: 2025-06-20

## 2025-07-02 DIAGNOSIS — E66.813 CLASS 3 SEVERE OBESITY DUE TO EXCESS CALORIES WITH SERIOUS COMORBIDITY AND BODY MASS INDEX (BMI) OF 40.0 TO 44.9 IN ADULT: ICD-10-CM

## 2025-07-03 ENCOUNTER — OFFICE VISIT (OUTPATIENT)
Dept: FAMILY MEDICINE CLINIC | Facility: CLINIC | Age: 60
End: 2025-07-03
Payer: COMMERCIAL

## 2025-07-03 VITALS
WEIGHT: 249 LBS | SYSTOLIC BLOOD PRESSURE: 120 MMHG | DIASTOLIC BLOOD PRESSURE: 82 MMHG | HEART RATE: 84 BPM | RESPIRATION RATE: 18 BRPM | TEMPERATURE: 96.1 F | HEIGHT: 67 IN | BODY MASS INDEX: 39.08 KG/M2

## 2025-07-03 DIAGNOSIS — Z13.1 SCREENING FOR DIABETES MELLITUS: ICD-10-CM

## 2025-07-03 DIAGNOSIS — E66.813 CLASS 3 SEVERE OBESITY DUE TO EXCESS CALORIES WITH SERIOUS COMORBIDITY AND BODY MASS INDEX (BMI) OF 40.0 TO 44.9 IN ADULT: ICD-10-CM

## 2025-07-03 DIAGNOSIS — Z13.6 SCREENING FOR CARDIOVASCULAR CONDITION: ICD-10-CM

## 2025-07-03 DIAGNOSIS — I10 ESSENTIAL HYPERTENSION: ICD-10-CM

## 2025-07-03 DIAGNOSIS — E66.01 MORBID OBESITY (HCC): Primary | ICD-10-CM

## 2025-07-03 DIAGNOSIS — Z13.29 SCREENING FOR THYROID DISORDER: ICD-10-CM

## 2025-07-03 LAB
ALBUMIN SERPL-MCNC: 4.5 G/DL (ref 3.8–4.9)
ALP SERPL-CCNC: 62 IU/L (ref 44–121)
ALT SERPL-CCNC: 28 IU/L (ref 0–32)
AST SERPL-CCNC: 25 IU/L (ref 0–40)
BILIRUB SERPL-MCNC: 0.4 MG/DL (ref 0–1.2)
BUN SERPL-MCNC: 17 MG/DL (ref 6–24)
BUN/CREAT SERPL: 26 (ref 9–23)
CALCIUM SERPL-MCNC: 9.9 MG/DL (ref 8.7–10.2)
CHLORIDE SERPL-SCNC: 102 MMOL/L (ref 96–106)
CHOLEST SERPL-MCNC: 181 MG/DL (ref 100–199)
CO2 SERPL-SCNC: 23 MMOL/L (ref 20–29)
CREAT SERPL-MCNC: 0.66 MG/DL (ref 0.57–1)
EGFR: 102 ML/MIN/1.73
EST. AVERAGE GLUCOSE BLD GHB EST-MCNC: 114 MG/DL
GLOBULIN SER-MCNC: 2.9 G/DL (ref 1.5–4.5)
GLUCOSE SERPL-MCNC: 99 MG/DL (ref 70–99)
HBA1C MFR BLD: 5.6 % (ref 4.8–5.6)
HCV AB S/CO SERPL IA: NON REACTIVE
HDLC SERPL-MCNC: 60 MG/DL
LDLC SERPL CALC-MCNC: 86 MG/DL (ref 0–99)
LDLC/HDLC SERPL: 1.4 RATIO (ref 0–3.2)
MICRODELETION SYND BLD/T FISH: NORMAL
POTASSIUM SERPL-SCNC: 4.3 MMOL/L (ref 3.5–5.2)
PROT SERPL-MCNC: 7.4 G/DL (ref 6–8.5)
SL AMB VLDL CHOLESTEROL CALC: 35 MG/DL (ref 5–40)
SODIUM SERPL-SCNC: 140 MMOL/L (ref 134–144)
TRIGL SERPL-MCNC: 207 MG/DL (ref 0–149)
TSH SERPL DL<=0.005 MIU/L-ACNC: 1.61 UIU/ML (ref 0.45–4.5)

## 2025-07-03 PROCEDURE — 99213 OFFICE O/P EST LOW 20 MIN: CPT | Performed by: NURSE PRACTITIONER

## 2025-07-03 RX ORDER — SEMAGLUTIDE 2.4 MG/.75ML
INJECTION, SOLUTION SUBCUTANEOUS
Qty: 3 ML | Refills: 0 | Status: SHIPPED | OUTPATIENT
Start: 2025-07-03 | End: 2025-07-03 | Stop reason: SDUPTHER

## 2025-07-03 RX ORDER — SEMAGLUTIDE 2.4 MG/.75ML
2.4 INJECTION, SOLUTION SUBCUTANEOUS WEEKLY
Qty: 3 ML | Refills: 5 | Status: SHIPPED | OUTPATIENT
Start: 2025-07-03

## 2025-07-03 NOTE — PROGRESS NOTES
"Name: Kacey Seay      : 1965      MRN: [id number]  Encounter Provider: KAREEM Hester  Encounter Date: 7/3/2025   Encounter department: [facility]  :  Assessment & Plan  Morbid obesity (HCC)           Essential hypertension  Stable        Class 3 severe obesity due to excess calories with serious comorbidity and body mass index (BMI) of 40.0 to 44.9 in adult  Continue Wegovy    Orders:    Semaglutide-Weight Management (Wegovy) 2.4 MG/0.75ML; Inject 0.75 mL (2.4 mg total) under the skin once a week    Screening for cardiovascular condition    Orders:    Comprehensive metabolic panel; Future    Lipid Panel with LDL/HDL Ratio; Future    Screening for diabetes mellitus    Orders:    Hemoglobin A1c (w/out EAG); Future    Screening for thyroid disorder    Orders:    TSH, 3rd generation with Free T4 reflex; Future           History of Present Illness   Here today for follow up, labs prior.  No concerns today.  She has been doing well, compliant with her medications.         Review of Systems   Constitutional: Negative.    Respiratory: Negative.     Cardiovascular: Negative.    Gastrointestinal: Negative.    Neurological: Negative.        Objective   /82   Pulse 84   Temp (!) 96.1 °F (35.6 °C)   Resp 18   Ht 5' 6.75\" (1.695 m)   Wt 113 kg (249 lb)   BMI 39.29 kg/m²      Physical Exam  Vitals and nursing note reviewed.   Constitutional:       General: She is not in acute distress.     Appearance: Normal appearance.   HENT:      Head: Normocephalic and atraumatic.     Eyes:      Conjunctiva/sclera: Conjunctivae normal.     Neck:      Vascular: No carotid bruit.     Cardiovascular:      Rate and Rhythm: Normal rate and regular rhythm.      Pulses: Normal pulses.      Heart sounds: Normal heart sounds. No murmur heard.  Pulmonary:      Effort: Pulmonary effort is normal.      Breath sounds: Normal breath sounds.     Skin:     General: Skin is warm and dry.     Neurological:      " Mental Status: She is alert.     Psychiatric:         Mood and Affect: Mood normal.         Behavior: Behavior normal.